# Patient Record
Sex: FEMALE | Race: WHITE | NOT HISPANIC OR LATINO | Employment: FULL TIME | URBAN - METROPOLITAN AREA
[De-identification: names, ages, dates, MRNs, and addresses within clinical notes are randomized per-mention and may not be internally consistent; named-entity substitution may affect disease eponyms.]

---

## 2017-01-10 ENCOUNTER — TELEPHONE (OUTPATIENT)
Dept: FAMILY MEDICINE | Facility: CLINIC | Age: 32
End: 2017-01-10

## 2017-01-10 NOTE — TELEPHONE ENCOUNTER
Pt reports positive home pregnancy test. LMP mid-December 2016. Nurse visit scheduled 1/11/17.   Den Shields RN

## 2017-01-11 ENCOUNTER — TELEPHONE (OUTPATIENT)
Dept: OBGYN | Facility: CLINIC | Age: 32
End: 2017-01-11

## 2017-01-11 ENCOUNTER — PRENATAL OFFICE VISIT (OUTPATIENT)
Dept: NURSING | Facility: CLINIC | Age: 32
End: 2017-01-11
Payer: COMMERCIAL

## 2017-01-11 VITALS
DIASTOLIC BLOOD PRESSURE: 70 MMHG | WEIGHT: 167.9 LBS | BODY MASS INDEX: 26.98 KG/M2 | TEMPERATURE: 97.4 F | SYSTOLIC BLOOD PRESSURE: 110 MMHG | RESPIRATION RATE: 14 BRPM | HEIGHT: 66 IN | OXYGEN SATURATION: 99 % | HEART RATE: 83 BPM

## 2017-01-11 DIAGNOSIS — N91.2 ABSENCE OF MENSTRUATION: Primary | ICD-10-CM

## 2017-01-11 DIAGNOSIS — Z32.01 PREGNANCY TEST POSITIVE: Primary | ICD-10-CM

## 2017-01-11 LAB — BETA HCG QUAL IFA URINE: POSITIVE

## 2017-01-11 PROCEDURE — 84703 CHORIONIC GONADOTROPIN ASSAY: CPT | Performed by: FAMILY MEDICINE

## 2017-01-11 PROCEDURE — 99207 ZZC NO CHARGE NURSE ONLY: CPT

## 2017-01-11 NOTE — PROGRESS NOTES
Subjective: 31 year old patient presents for pregnancy confirmation. Her last menstrual period was 12/15/16. She has had a positive pregnancy test.  This is her first pregnancy, G-0, P-0. She has had previous none. She would like to be seen Dr Ashley for her prenatal care. She will start prenatal vitamins.        Her urine pregnancy test is positive.    Assessment: positive  pregnancy confirmation.    Plan: Patient has an EDC of 9/21/17. Is currently 4 weeks along. She will schedule her first OB appointment with Dr Ashley. Risk assessment done. We discussed dates, basic pregnancy care, diet, exercise and prenatal vitamins.     Pre Term Labor Risk Assessment   1. Is the patient's age <18 or >40? no  2. Does the patient have a BMI < 18.5? no  3. If previous pregnancy, was delivery within previous 6 months? no  4. Have you ever been diagnosed with pyelonephritis? no  5. Have you ever delivered a baby prior to 37 weeks gestation? no  6. Have you ever been told you have a uterine anomaly? no  7. Do you currently have uterine fibroids? no  8. Have you had any gynecological surgical procedures such as cervical conization, a LEEP procedure, laser treatment or cryosurgery of the cervix? yes  9. Did your mother take DEIRDRE or any other hormones when she was pregnant with you? no  10. Did conception for this pregnancy occur via In Vitro Fertilization? no  11. Are you carrying twins? no  12. Do you currently use tobacco products? no  13. Prior to this pregnancy, how much alcohol did you drink each week? (if >7/week= high risk..)  no  14. Since you learned you were pregnant, how much beer, wine or hard liquor did you drink per week? (anything >0 = high risk) none  15. Prior to learning you are pregnant, did you use any of the following: marijuana, prescription narcotics, speed, cocaine, heroin, hallucinogens or other drugs? (any use within 1 yr = high risk)  none  16. Since you learned you are pregnant, have you used any of the  following: marijuana, prescription narcotics, speed, cocaine, heroin, hallucinogens or other drugs? (any use = high risk)  none  17. Do you have a history of chemical dependency (yes=high risk)  no  18. Have you ever been treated for more than 1 Urinary Tract Infection during this pregnancy? no  19. Do you have a history of Depression, Bi-polar disorder, anxiety, schizophrenia or other mental illness?  No   20. Are you currently being treated for depression, bi-polar disorder, anxiety, schizophrenia or other mental illness? no  21. Have you had Chlamydia or gonorrhea during this pregnancy? no  22. Periodontal disease (gum disease)? no  23. Has anyone hit, slapped, kicked or otherwise hurt you? no  24. Has anyone forced you to have sex when you didn't want to? No   Summary:   Patient is not high risk for  Labor        Mariama Hunt RN, BSN  Message handled by Nurse Triage.

## 2017-01-11 NOTE — TELEPHONE ENCOUNTER
Pt here for first OB, need to huddle with Lorin (ob nurse-out ill today), re: u/s order and scheduling first OB appointment, call pt on cell tomorrow with plan    Telephone Information:   Mobile [phone]     Mariama Hunt, RN, BSN  Message handled by Nurse Triage.

## 2017-01-12 NOTE — TELEPHONE ENCOUNTER
Called pt, ob u/s ordered, first OB appointment scheduled, written in book  Mariama Hunt RN, BSN  Message handled by Nurse Triage.

## 2017-01-25 ENCOUNTER — RADIANT APPOINTMENT (OUTPATIENT)
Dept: ULTRASOUND IMAGING | Facility: CLINIC | Age: 32
End: 2017-01-25
Attending: OBSTETRICS & GYNECOLOGY
Payer: COMMERCIAL

## 2017-01-25 DIAGNOSIS — Z32.01 PREGNANCY TEST POSITIVE: ICD-10-CM

## 2017-01-25 PROCEDURE — 76801 OB US < 14 WKS SINGLE FETUS: CPT | Performed by: OBSTETRICS & GYNECOLOGY

## 2017-01-25 PROCEDURE — 76817 TRANSVAGINAL US OBSTETRIC: CPT | Performed by: OBSTETRICS & GYNECOLOGY

## 2017-01-30 ENCOUNTER — TELEPHONE (OUTPATIENT)
Dept: OBGYN | Facility: CLINIC | Age: 32
End: 2017-01-30

## 2017-01-30 NOTE — TELEPHONE ENCOUNTER
Spotting quarter size started yesterday it was brown, just this one spot around 1 pm or so.  10 am did low cardio workout 20 minutes (no new exercise).    Today it was 50/50 on red/brown still quarter, now and just this one spot.    No Cramping, Abd pain nothing.    Please advise  Chris SMITH RN

## 2017-01-31 NOTE — TELEPHONE ENCOUNTER
Pelvic rest until no bleeding for a week. No other restrictions needed. If bleeding becomes heavier or if she has cramping with bleeding, she should call back and should be seen after a repeat ultrasound. Thanks.  Naa Ashley MD

## 2017-02-24 ENCOUNTER — PRENATAL OFFICE VISIT (OUTPATIENT)
Dept: OBGYN | Facility: CLINIC | Age: 32
End: 2017-02-24
Payer: COMMERCIAL

## 2017-02-24 VITALS
SYSTOLIC BLOOD PRESSURE: 110 MMHG | WEIGHT: 172 LBS | HEIGHT: 65 IN | BODY MASS INDEX: 28.66 KG/M2 | TEMPERATURE: 98.1 F | DIASTOLIC BLOOD PRESSURE: 60 MMHG

## 2017-02-24 DIAGNOSIS — O26.90 PREGNANCY RELATED CONDITION, UNSPECIFIED TRIMESTER: Primary | ICD-10-CM

## 2017-02-24 DIAGNOSIS — Z32.01 PREGNANCY TEST POSITIVE: Primary | ICD-10-CM

## 2017-02-24 DIAGNOSIS — Z34.01 ENCOUNTER FOR SUPERVISION OF NORMAL FIRST PREGNANCY IN FIRST TRIMESTER: ICD-10-CM

## 2017-02-24 LAB
ERYTHROCYTE [DISTWIDTH] IN BLOOD BY AUTOMATED COUNT: 12.5 % (ref 10–15)
HCT VFR BLD AUTO: 38.3 % (ref 35–47)
HGB BLD-MCNC: 13.1 G/DL (ref 11.7–15.7)
MCH RBC QN AUTO: 31.9 PG (ref 26.5–33)
MCHC RBC AUTO-ENTMCNC: 34.2 G/DL (ref 31.5–36.5)
MCV RBC AUTO: 93 FL (ref 78–100)
PLATELET # BLD AUTO: 221 10E9/L (ref 150–450)
RBC # BLD AUTO: 4.11 10E12/L (ref 3.8–5.2)
WBC # BLD AUTO: 9.2 10E9/L (ref 4–11)

## 2017-02-24 PROCEDURE — 86900 BLOOD TYPING SEROLOGIC ABO: CPT | Performed by: OBSTETRICS & GYNECOLOGY

## 2017-02-24 PROCEDURE — 90471 IMMUNIZATION ADMIN: CPT | Performed by: OBSTETRICS & GYNECOLOGY

## 2017-02-24 PROCEDURE — 36415 COLL VENOUS BLD VENIPUNCTURE: CPT | Performed by: OBSTETRICS & GYNECOLOGY

## 2017-02-24 PROCEDURE — 86762 RUBELLA ANTIBODY: CPT | Performed by: OBSTETRICS & GYNECOLOGY

## 2017-02-24 PROCEDURE — 85027 COMPLETE CBC AUTOMATED: CPT | Performed by: OBSTETRICS & GYNECOLOGY

## 2017-02-24 PROCEDURE — 90686 IIV4 VACC NO PRSV 0.5 ML IM: CPT | Performed by: OBSTETRICS & GYNECOLOGY

## 2017-02-24 PROCEDURE — 87591 N.GONORRHOEAE DNA AMP PROB: CPT | Performed by: OBSTETRICS & GYNECOLOGY

## 2017-02-24 PROCEDURE — 86780 TREPONEMA PALLIDUM: CPT | Performed by: OBSTETRICS & GYNECOLOGY

## 2017-02-24 PROCEDURE — 87491 CHLMYD TRACH DNA AMP PROBE: CPT | Performed by: OBSTETRICS & GYNECOLOGY

## 2017-02-24 PROCEDURE — 87086 URINE CULTURE/COLONY COUNT: CPT | Performed by: OBSTETRICS & GYNECOLOGY

## 2017-02-24 PROCEDURE — 87389 HIV-1 AG W/HIV-1&-2 AB AG IA: CPT | Performed by: OBSTETRICS & GYNECOLOGY

## 2017-02-24 PROCEDURE — 86850 RBC ANTIBODY SCREEN: CPT | Performed by: OBSTETRICS & GYNECOLOGY

## 2017-02-24 PROCEDURE — 99207 ZZC FIRST OB VISIT: CPT | Performed by: OBSTETRICS & GYNECOLOGY

## 2017-02-24 PROCEDURE — 86901 BLOOD TYPING SEROLOGIC RH(D): CPT | Performed by: OBSTETRICS & GYNECOLOGY

## 2017-02-24 PROCEDURE — 87340 HEPATITIS B SURFACE AG IA: CPT | Performed by: OBSTETRICS & GYNECOLOGY

## 2017-02-24 NOTE — MR AVS SNAPSHOT
After Visit Summary   2/24/2017    Kendal Miranad    MRN: [id number]           Patient Information     Date Of Birth          1985        Visit Information        Provider Department      2/24/2017 2:45 PM Naa Ashley MD [facility]        Today's Diagnoses     Pregnancy test positive    -  1    Encounter for supervision of normal first pregnancy in first trimester           Follow-ups after your visit        Additional Services     [facility]ETAL [facility] REFERRAL-PREGNANCY       >> Patient may proceed with recommendations for further testing as directed by the Maternal Fetal Medicine Specialist >>    >> If requesting Fetal Echo: MFM will determine appropriate location for exam due to indication.    >> If requesting Lung Maturity Amnio:  If results indicate fetal lung maturity, induction or C/S is recommended within 36 hours.  Please schedule accordingly.     Dear Patient:   Please be aware that coverage of these services is subject to the terms and limitations of your health insurance plan.  Call member services at your health plan with any benefit or coverage questions.      Please bring the following to your appointment:    >>  Any x-rays, CTs or MRIs which have been performed.  Contact the facility where they were done to arrange for  prior to your scheduled appointment.  Any new CT, MRI or other procedures ordered by your specialist must be performed at a [place] facility or coordinated by your clinic's referral office.  >>  List of current medications   >>  This referral request   >>  Any documents/labs given to you for this referral                  Future tests that were ordered for you today     Open Future Orders        Priority Expected Expires Ordered    MFM Genetic Counseling Routine  2/25/2018 2/24/2017    MF Nuchal Transluc w US Single Routine  12/24/2017 2/24/2017    [facility] US Comprehensive Single Routine  12/24/2017 2/24/2017            Who to contact      "If you have questions or need follow up information about today's clinic visit or your schedule please contact [facility] directly at [phone].  Normal or non-critical lab and imaging results will be communicated to you by MyChart, letter or phone within 4 business days after the clinic has received the results. If you do not hear from us within 7 days, please contact the clinic through [email or web address] or phone. If you have a critical or abnormal lab result, we will notify you by phone as soon as possible.  Submit refill requests through Proxly or call your pharmacy and they will forward the refill request to us. Please allow 3 business days for your refill to be completed.          Additional Information About Your Visit        [email or web address] Information     Proxly gives you secure access to your electronic health record. If you see a primary care provider, you can also send messages to your care team and make appointments. If you have questions, please call your primary care clinic.  If you do not have a primary care provider, please call [phone] and they will assist you.        Care EveryWhere ID     This is your Care EveryWhere ID. This could be used by other organizations to access your [facility] medical records  [id number]        Your Vitals Were     Temperature Height Last Period BMI (Body Mass Index)          98.1  F (36.7  C) (Oral) 5' 5\" (1.651 m) 12/15/2016 (Exact Date) 28.62 kg/m2         Blood Pressure from Last 3 Encounters:   02/24/17 110/60   01/11/17 110/70   11/03/16 121/72    Weight from Last 3 Encounters:   02/24/17 172 lb (78 kg)   01/11/17 167 lb 14.4 oz (76.2 kg)   11/03/16 165 lb (74.8 kg)              We Performed the Following     ABO/Rh type and screen     Anti Treponema     CBC with platelets     CHLAMYDIA TRACHOMATIS PCR     Hepatitis B surface antigen     HIV Antigen Antibody Combo     MAT FETAL MED CTR REFERRAL-PREGNANCY     NEISSERIA GONORRHOEA PCR     PRESERV FREE " INFLU VAC SPLIT (3+YRS),IM     Rubella Antibody IgG Quantitative     Urine Culture Aerobic Bacterial        Primary Care Provider Office Phone # Fax #    Susan Rachele Haase, APRN [phone]       [facility] [address]  [facility] [address]        Thank you!     Thank you for choosing [facility]  for your care. Our goal is always to provide you with excellent care. Hearing back from our patients is one way we can continue to improve our services. Please take a few minutes to complete the written survey that you may receive in the mail after your visit with us. Thank you!             Your Updated Medication List - Protect others around you: Learn how to safely use, store and throw away your medicines at www.disposemymeds.org.          This list is accurate as of: 2/24/17  4:54 PM.  Always use your most recent med list.                   Brand Name Dispense Instructions for use    hydrOXYzine 25 MG tablet    ATARAX    30 tablet    Take 1-2 tablets (25-50 mg) by mouth every 8 hours as needed for anxiety

## 2017-02-24 NOTE — NURSING NOTE
"Chief Complaint   Patient presents with     Prenatal Care   NPN MD visit.10w1d  Would like 1st trimester screening.  Audelia Mcgowan MA        Initial /60  Temp 98.1  F (36.7  C) (Oral)  Ht 5' 5\" (1.651 m)  Wt 172 lb (78 kg)  LMP 12/15/2016 (Exact Date)  BMI 28.62 kg/m2 Estimated body mass index is 28.62 kg/(m^2) as calculated from the following:    Height as of this encounter: 5' 5\" (1.651 m).    Weight as of this encounter: 172 lb (78 kg).  Medication Reconciliation: complete    "

## 2017-02-24 NOTE — PROGRESS NOTES
This is a 31 year old female patient,   who presents for her first obstetrical visit.    EDC Sep 21, 2017 by lmp and 1TM US which makes her 10w1d weeks today.  Her cycles are regular.  Her last menstrual period was normal. Since her LMP, she has experienced  nausea and fatigue).  She denies emesis, abdominal pain and vaginal bleeding other than small amount of spotting at 6 weeks. Ultrasound in the 1st trimester showed EDC consistent with dates by LMP.     Past History:  Her past medical history   Past Medical History   Diagnosis Date     ASCUS on Pap smear 11/10/14     Neg HPV     High grade squamous intraepithelial lesion of cervix      LSIL (low grade squamous intraepithelial lesion) on Pap smear      S/P LEEP of cervix 13     pt lives in [facility]   .      This is her first pregnancy    Since her last LMP she denies use of alcohol, tobacco and street drugs.    HISTORY:  Obstetric History       T0      TAB0   SAB0   E0   M0   L0       # Outcome Date GA Lbr Carrillo/2nd Weight Sex Delivery Anes PTL Lv   1 Current                 Past Medical History   Diagnosis Date     ASCUS on Pap smear 11/10/14     Neg HPV     High grade squamous intraepithelial lesion of cervix      LSIL (low grade squamous intraepithelial lesion) on Pap smear      S/P LEEP of cervix 13     pt lives in [facility]     Past Surgical History   Procedure Laterality Date     Buckner,cx w/up adj vag,w/bx, cx  2013     MAXIMO 1. 11, 111     Leep tx, cervical  2013     Family History   Problem Relation Age of Onset     Hypertension Mother      Hypertension Maternal Grandmother      DIABETES No family hx of      CANCER No family hx of      Social History     Social History     Marital status:      Spouse name: N/A     Number of children: N/A     Years of education: N/A     Occupational History      [place]     [employer]     Social History Main Topics     Smoking status: Never Smoker     Smokeless  "tobacco: Never Used      Comment: non smoking home     Alcohol use 0.0 oz/week     0 Standard drinks or equivalent per week      Comment: spring break     Drug use: No     Sexual activity: Yes     Partners: Male     Birth control/ protection: Pill     Other Topics Concern     None     Social History Narrative     Current Outpatient Prescriptions   Medication Sig     hydrOXYzine (ATARAX) 25 MG tablet Take 1-2 tablets (25-50 mg) by mouth every 8 hours as needed for anxiety     No current facility-administered medications for this visit.      Allergies   Allergen Reactions     Sulfa Drugs        Past medical, surgical, social and family history were reviewed and updated in EPIC.    ROS:   12 point review of systems negative other than symptoms noted below.    EXAM:  /60  Temp 98.1  F (36.7  C) (Oral)  Ht 5' 5\" (1.651 m)  Wt 172 lb (78 kg)  LMP 12/15/2016 (Exact Date)  BMI 28.62 kg/m2   BMI: Body mass index is 28.62 kg/(m^2).    EXAM:  Constitutional: Appearance: Well nourished, well developed alert, in no acute distress  Neck:  Lymph Nodes:  No lymphadenopathy present    Thyroid:  Gland size normal, nontender, no nodules or masses present  on palpation  Chest:  Respiratory Effort:  Breathing unlabored  Abdominal Examination:  Abdomen nontender to palpation, tone normal without     rigidity or guarding, no masses present, umbilicus without lesions    Liver and speen:  No hepatomegaly present, liver nontender to palpation    Hernias:  No hernias present  Lymphatic: Lymph Nodes:  No other lymphadenopathy present  Skin:  General Inspection:  No rashes present, no lesions present, no areas of  discoloration.  Neurologic/Psychiatric:    Mental Status:  Oriented X3     Pelvis: Not done today. Due for repeat diagnostic pap and HPV in May 2017.    ASSESSMENT:      ICD-10-CM    1. Pregnancy test positive Z32.01 MAT FETAL MED CTR REFERRAL-PREGNANCY     ABO/Rh type and screen     CBC with platelets     Urine Culture Aerobic " Bacterial     Anti Treponema     Hepatitis B surface antigen     HIV Antigen Antibody Combo     NEISSERIA GONORRHOEA PCR     CHLAMYDIA TRACHOMATIS PCR     Rubella Antibody IgG Quantitative   2. Encounter for supervision of normal first pregnancy in first trimester Z34.01        PLAN:    Prenatal labs ordered. She has no questions.    Discussed options for screening for chromosomal anomalies, including first screen, noninvasive prenatal testing, CVS/amniocentesis, quad screen, and ultrasound at 18-20 weeks. She is electing first trimester screen.    Reviewed early pregnancy education, diet, exercise, prenatal vitamins, intercourse. Reviewed the call schedule, labor and delivery, and the schedule of prenatal visits.    Follow up in 4 weeks. She is encouraged to call sooner with questions or concerns.      Naa Ashley MD

## 2017-02-25 LAB
ABO + RH BLD: NORMAL
ABO + RH BLD: NORMAL
BACTERIA SPEC CULT: NORMAL
BLD GP AB SCN SERPL QL: NORMAL
BLOOD BANK CMNT PATIENT-IMP: NORMAL
MICRO REPORT STATUS: NORMAL
SPECIMEN EXP DATE BLD: NORMAL
SPECIMEN SOURCE: NORMAL

## 2017-02-26 ENCOUNTER — TELEPHONE (OUTPATIENT)
Dept: NURSING | Facility: CLINIC | Age: 32
End: 2017-02-26

## 2017-02-26 LAB
C TRACH DNA SPEC QL NAA+PROBE: NORMAL
N GONORRHOEA DNA SPEC QL NAA+PROBE: NORMAL
SPECIMEN SOURCE: NORMAL
SPECIMEN SOURCE: NORMAL
T PALLIDUM IGG+IGM SER QL: NEGATIVE

## 2017-02-26 NOTE — TELEPHONE ENCOUNTER
"Call Type: Triage Call    Presenting Problem: \" I have a sore throat starting yesterday, I am  10 weeks pregnant, I don't have a fever, cough or headache or rash.  I have a very mild runny nose and congestion. \" Advised to do sore  throat home care for pregnancy. If sore throat continues for next 24  hours or if develops a fever to call PCP tomorrow.  Triage Note:  Guideline Title: Sore Throat or Hoarseness  Recommended Disposition: Provide Home/Self Care  Original Inclination: Wanted to speak with a nurse  Override Disposition:  Intended Action: Follow Selfcare / Homecare  Physician Contacted: No  Sore throat AND no other symptoms ?  YES  Creamy-white sore patches in the mouth or throat AND patches brush off or come off  when eating, leaving a new, bleeding surface ? NO  Known exposure to mono (infectious mononucleosis) ? NO  Evaluated by provider AND no improvement or symptoms worsening when following  recommended treatment plan for 48 hours ? NO  Choking sensation, cannot swallow own saliva with associated drooling and soft  muffled voice ? NO  Exposure to irritants (smoke, fumes, gases, etc.) ? NO  Cannot open mouth fully due to severe pain ? NO  Marked difficulty swallowing due to sore throat unresponsive to 12 hours of home  care ? NO  New onset of painful, swollen glands on sides of neck or under jaw ? NO  Voice overuse (yelling, singing or prolonged speaking) AND not responding to home  care ? NO  Temperature of 101.5 F (38.6 C) or greater that has not responded to 24 hours of  home care measures ? NO  Exposure to an individual with diagnosed strep throat but who has not completed 24  hours of antibiotic therapy ? NO  Localized tender, swollen glands (lymph nodes) that persist or are unimproved  after 14 days ? NO  Severe breathing problems not related to nasal congestion ? NO  Coughed out foreign object after choking episode and NO further choking symptoms  (can now talk, no coughing, gasping, or wheezing) " ? NO  Recent or recurrent episodes of sneezing, nasal congestion; watery nasal drainage;  scratchy/itchy throat; red/itchy/watery eyes or cough unrelieved after one week  of home care measures ? NO  Sore throat not responding to 7 days of home care ? NO  Has enlarged tonsils covered by yellow-white patches ? NO  Hoarseness that has persisted more than 2 weeks ? NO  Swallowing difficulty related to anything other than sore throat such as swallowed  toxic or caustic substance, mechanical abnormalities, or motility problems ? NO  New onset of stiff neck causing pain with forward head movement, severe  generalized headache, fever, or altered mental status ? NO  Non-itchy, red skin rash ? NO  No tonsils and white patches on back of throat ? NO  Pregnant and has a temperature up to 100 F (37.7 C) that has not responded to 3  days of home care ? NO  Had negative rapid strep test but no throat culture; having continued symptoms of  sore throat, fever, swollen glands ? NO  Sore throat with negative rapid strep test and negative throat culture ? NO  Previous call within last week for similar symptoms AND has followed recommended  self care measures for a week but symptoms have not improved or symptoms have  worsened. ? NO  Pregnant and has temperature 100F (37.7 C) or greater ? NO  Any temperature elevation in an immunocompromised individual OR frail elderly with  signs of dehydration ? NO  Physician Instructions:  Care Advice: Drink more fluids -- water, low-sugar juices, tea and warm  soup, especially chicken broth, are options.  Avoid caffeinated or  alcoholic beverages because they can increase the chance of dehydration.  SYMPTOM / CONDITION MANAGEMENT  To help relieve nasal congestion, use nonprescription saline nasal spray  according to label instructions or as directed by a healthcare provider.  Sore Throat Relief:   - Use salt water gargles (1/2 teaspoon salt in 8 oz.  [240mL] warm water) every one to two hours. - Use  a vaporizer or cool mist  humidifier in the room when sleeping. - Suck on hard candy, nonprescription  or herbal throat lozenges (sugar-free if diabetic) - Eat soothing, soft  food/fluids (broths, soups, or honey and lemon juice in hot tea, Popsicles,  frozen yogurt or sherbet, scrambled eggs, cooked cereals, Jell-O or  puddings) whichever is most comforting. - Avoid eating salty, spicy or  acidic foods.  Total water intake includes drinking water, water in beverages, and water  contained in food. Fluids make up about 80% of the body's total hydration  need. Individual fluid requirement to maintain hydration vary based on  physical activity, environmental factors and illness. Limit fluids that  contain sugar, caffeine, or alcohol. Urine will be very light yellow color  when you drink enough fluids.

## 2017-02-27 LAB
HBV SURFACE AG SERPL QL IA: NONREACTIVE
HIV 1+2 AB+HIV1 P24 AG SERPL QL IA: NORMAL
RUBV IGG SERPL IA-ACNC: 62 IU/ML

## 2017-03-01 ENCOUNTER — OFFICE VISIT (OUTPATIENT)
Dept: FAMILY MEDICINE | Facility: CLINIC | Age: 32
End: 2017-03-01
Payer: COMMERCIAL

## 2017-03-01 VITALS
TEMPERATURE: 97.5 F | OXYGEN SATURATION: 97 % | BODY MASS INDEX: 27.83 KG/M2 | DIASTOLIC BLOOD PRESSURE: 64 MMHG | SYSTOLIC BLOOD PRESSURE: 108 MMHG | WEIGHT: 173.2 LBS | HEART RATE: 83 BPM | HEIGHT: 66 IN

## 2017-03-01 DIAGNOSIS — J06.9 UPPER RESPIRATORY TRACT INFECTION, UNSPECIFIED TYPE: Primary | ICD-10-CM

## 2017-03-01 DIAGNOSIS — Z33.1 PREGNANCY, INCIDENTAL: ICD-10-CM

## 2017-03-01 PROCEDURE — 99213 OFFICE O/P EST LOW 20 MIN: CPT | Performed by: INTERNAL MEDICINE

## 2017-03-01 RX ORDER — LORATADINE 10 MG/1
10 TABLET ORAL DAILY
Qty: 30 TABLET | Refills: 1 | Status: ON HOLD | COMMUNITY
Start: 2017-03-01 | End: 2020-02-21

## 2017-03-01 NOTE — PROGRESS NOTES
SUBJECTIVE:                                                    Kendal Miranda is a 31 year old female who presents to clinic today for the following health issues:     HPI    RESPIRATORY SYMPTOMS    Duration: Five days.    Description  Sore throat, facial pain/pressure, cough, fever, chills, ear pain bilaterally and headaches.    Severity: Moderate    Accompanying signs and symptoms: None    History (predisposing factors):  None    Precipitating or alleviating factors: None    Therapies tried and outcome:  Benadryl last night, rest, and increased fluid intake. Symptoms have not resolved.    Patient is 11 weeks pregnant, which is why she is increasingly concerned.       Problem list and histories reviewed & adjusted, as indicated.  Additional history: as documented    BP Readings from Last 3 Encounters:   03/01/17 108/64   02/24/17 110/60   01/11/17 110/70    Wt Readings from Last 3 Encounters:   03/01/17 78.6 kg (173 lb 3.2 oz)   02/24/17 78 kg (172 lb)   01/11/17 76.2 kg (167 lb 14.4 oz)         Labs reviewed in [facility]      REVIEW OF SYSTEMS:  C: POSITIVE for fever and chills; NEGATIVE for change in weight  E: NEGATIVE for vision changes or irritation  E/M: POSITIVE for sore throat, facial pain/pressure, and bilateral ear pain; NEGATIVE for mouth problems  R: POSITIVE for a cough; NEGATIVE for SOB  CV: NEGATIVE for chest pain, palpitations or peripheral edema  GI: NEGATIVE for nausea, abdominal pain, heartburn, or change in bowel habits  M: NEGATIVE for significant arthralgias or myalgia  N: POSITIVE for headaches; NEGATIVE for weakness, dizziness or paresthesias  P: NEGATIVE for changes in mood or affect    This document serves as a record of the services and decisions personally performed and made by Tabatha Mckeon MD. It was created on her behalf by Arcelia Serrano, a trained medical scribe. The creation of this document is based the provider's statements to the medical scribe.  Arcelia Serrano, March 1, 2017 3:39  "PM     OBJECTIVE:                                                    /64 (BP Location: Right arm, Patient Position: Chair, Cuff Size: Adult Large)  Pulse 83  Temp 97.5  F (36.4  C) (Oral)  Ht 1.67 m (5' 5.75\")  Wt 78.6 kg (173 lb 3.2 oz)  LMP 12/15/2016 (Exact Date)  SpO2 97%  BMI 28.17 kg/m2  Body mass index is 28.17 kg/(m^2).    EXAM:  GENERAL: Patient appears healthy, alert and not distressed.  EYES: Eyes appear grossly normal to inspection, with normal conjunctivae and sclerae  HENT: Nasal congestion noted bilaterally with evidence of post nasal drainage along the posterior oropharynx; Ear canals and TM's appear normal  NECK: No adenopathy present, no asymmetry, masses, or scars noted, thyroid is normal to palpation  RESP: Lungs are clear to auscultation - no rales, rhonchi or wheezes present  CV: Regular rate and rhythm, normal S1 S2 heart sounds, no ectopy, no peripheral edema present, peripheral pulses normal, no carotid bruit.  MS: No gross musculoskeletal defects noted, no edema, gait is age appropriate without ataxia  NEURO: Patient exhibits normal strength and tone, normal speech and mentation  PSYCH: Mentation appears normal, affect is normal/bright      Diagnostic Test Results:  No results found for this or any previous visit (from the past 24 hour(s)).      ASSESSMENT/PLAN:                                                    (J06.9) Upper respiratory tract infection, unspecified type  (primary encounter diagnosis)  Comment: Patient is 11 weeks pregnant which limits medication therapies; Due to nasal congestion and symptoms related to post nasal drainage including cough, bilateral ear pain, and sore throat, I believe the patient would benefit from a Saline Nasal Spray, Neti-wash, and Loratidine/Claritin over-the-counter.  Plan: Try over-the-counter Claritin and a Saline Nasal Spray for relief of nasal congestion; Follow-up with the clinic if symptoms worsen or fail to improve.    (Z33.1) Pregnancy, " incidental  Comment: Patient is 11 weeks pregnant which limits which medications can be used for her current symptoms.  Plan: Try over-the-counter Claritin and a Saline Nasal Spray for relief of nasal congestion; Follow-up with the clinic if symptoms worsen or fail to improve.      The information in this document, created by a medical scribe for me, accurately reflects the services I personally performed and the decisions made by me. I have reviewed and approved this document for accuracy.  Dr. Tabatha Mckeon, 3:47 PM, March 1, 2017    Tabatha Mckeon MD  Internal Medicine   [facility]

## 2017-03-01 NOTE — PATIENT INSTRUCTIONS
I recommend you try using Saline Nasal Spray (or a neti-wash) and a daily dose of Claritin/Loratidine to help alleviate your nasal congestion. By clearing up your congestion, you will find relief of you ear pressure, sore throat, and cough. You can continue to use Benadryl at night to help clear-up your symptoms and help you sleep at night.

## 2017-03-01 NOTE — MR AVS SNAPSHOT
After Visit Summary   3/1/2017    Kendal Miranda    MRN: [id number]           Patient Information     Date Of Birth          1985        Visit Information        Provider Department      3/1/2017 3:00 PM [facility]        Today's Diagnoses     Upper respiratory tract infection, unspecified type    -  1    Pregnancy, incidental          Care Instructions    I recommend you try using Saline Nasal Spray (or a neti-wash) and a daily dose of Claritin/Loratidine to help alleviate your nasal congestion. By clearing up your congestion, you will find relief of you ear pressure, sore throat, and cough. You can continue to use Benadryl at night to help clear-up your symptoms and help you sleep at night.        Follow-ups after your visit        Your next 10 appointments already scheduled     Mar 14, 2017  8:00 AM CDT   Genetic Counseling with  GEN COUNSELOR 2   [facility] Maternal Fetal Medicine [facility])    [address] [facility] [address]  [facility] [address]   [phone]            Mar 14, 2017  8:45 AM CDT   MFM NUCHAL TRANS W US SINGLE with RHMFMUSR2   [facility] Maternal Fetal Medicine Ultrasound - [facility])    [address]  [facility] [id number] [phone]            Mar 14, 2017  9:15 AM CDT   Radiology MD with  MFM MD   [facility] M[facility] Fetal Medicine [facility])    [address] [facility] [address]  [facility] [address]   [phone]           Please arrive at the time given for your first appointment.  This visit is used internally to schedule the physician's time during your ultrasound.            Apr 28, 2017  8:00 AM CDT   M US COMP with RHMFMUSR3   [facility] Maternal Fetal Medicine Ultrasound - [facility])    [address]  [facility] [address] [phone]           Wear comfortable clothes and leave your  "valuables at home.            Apr 28, 2017  8:30 AM CDT   Radiology MD with RH ANGELICA CHILEL   [facility])    [address] [facility] [address]  [facility] [address] [phone]           Please arrive at the time given for your first appointment.  This visit is used internally to schedule the physician's time during your ultrasound.              Who to contact     If you have questions or need follow up information about today's clinic visit or your schedule please contact [facility] directly at [phone].  Normal or non-critical lab and imaging results will be communicated to you by DataCentredhart, letter or phone within 4 business days after the clinic has received the results. If you do not hear from us within 7 days, please contact the clinic through CEYXt or phone. If you have a critical or abnormal lab result, we will notify you by phone as soon as possible.  Submit refill requests through Lifeenergy or call your pharmacy and they will forward the refill request to us. Please allow 3 business days for your refill to be completed.          Additional Information About Your Visit        DataCentredhart Information     Lifeenergy gives you secure access to your electronic health record. If you see a primary care provider, you can also send messages to your care team and make appointments. If you have questions, please call your primary care clinic.  If you do not have a primary care provider, please call [phone] and they will assist you.        Care EveryWhere ID     This is your Care EveryWhere ID. This could be used by other organizations to access your [facility] medical records  [id number]        Your Vitals Were     Pulse Temperature Height Last Period Pulse Oximetry BMI (Body Mass Index)    83 97.5  F (36.4  C) (Oral) 5' 5.75\" (1.67 m) 12/15/2016 (Exact Date) 97% 28.17 kg/m2       Blood Pressure from Last 3 Encounters:   03/01/17 108/64   02/24/17 " 110/60   01/11/17 110/70    Weight from Last 3 Encounters:   03/01/17 173 lb 3.2 oz (78.6 kg)   02/24/17 172 lb (78 kg)   01/11/17 167 lb 14.4 oz (76.2 kg)              Today, you had the following     No orders found for display       Primary Care Provider Office Phone # Fax #    Lindy Sequeirahele Haase, CONSTANCE [phone]       [address] [address]        Thank you!     [facility] you [facility]  for your care. Our goal is always to provide you with excellent care. Hearing back from our patients is one way we can continue to improve our services. Please take a few minutes to complete the written survey that you may receive in the mail after your visit with us. Thank you!             Your Updated Medication List - Protect others around you: Learn how to safely use, store and throw away your medicines at www.disposemymeds.org.      Notice  As of 3/1/2017  3:45 PM    You have not been prescribed any medications.

## 2017-03-01 NOTE — NURSING NOTE
"Chief Complaint   Patient presents with     URI       Initial /64 (BP Location: Right arm, Patient Position: Chair, Cuff Size: Adult Large)  Pulse 83  Temp 97.5  F (36.4  C) (Oral)  Ht 5' 5.75\" (1.67 m)  Wt 173 lb 3.2 oz (78.6 kg)  LMP 12/15/2016 (Exact Date)  SpO2 97%  BMI 28.17 kg/m2 Estimated body mass index is 28.17 kg/(m^2) as calculated from the following:    Height as of this encounter: 5' 5.75\" (1.67 m).    Weight as of this encounter: 173 lb 3.2 oz (78.6 kg).  Medication Reconciliation: complete    "

## 2017-03-10 ENCOUNTER — PRE VISIT (OUTPATIENT)
Dept: MATERNAL FETAL MEDICINE | Facility: CLINIC | Age: 32
End: 2017-03-10

## 2017-03-14 ENCOUNTER — HOSPITAL ENCOUNTER (OUTPATIENT)
Dept: LAB | Facility: CLINIC | Age: 32
End: 2017-03-14
Attending: OBSTETRICS & GYNECOLOGY
Payer: COMMERCIAL

## 2017-03-14 ENCOUNTER — HOSPITAL ENCOUNTER (OUTPATIENT)
Dept: ULTRASOUND IMAGING | Facility: CLINIC | Age: 32
Discharge: HOME | End: 2017-03-14
Attending: OBSTETRICS & GYNECOLOGY | Admitting: OBSTETRICS & GYNECOLOGY
Payer: COMMERCIAL

## 2017-03-14 ENCOUNTER — OFFICE VISIT (OUTPATIENT)
Dept: MATERNAL FETAL MEDICINE | Facility: CLINIC | Age: 32
End: 2017-03-14
Attending: OBSTETRICS & GYNECOLOGY
Payer: COMMERCIAL

## 2017-03-14 DIAGNOSIS — O26.90 PREGNANCY RELATED CONDITION, UNSPECIFIED TRIMESTER: ICD-10-CM

## 2017-03-14 DIAGNOSIS — Z36.9 FIRST TRIMESTER SCREENING: Primary | ICD-10-CM

## 2017-03-14 DIAGNOSIS — Z36.3 ENCOUNTER FOR ROUTINE SCREENING FOR MALFORMATION USING ULTRASONICS: Primary | ICD-10-CM

## 2017-03-14 DIAGNOSIS — Z36.3 ENCOUNTER FOR ROUTINE SCREENING FOR MALFORMATION USING ULTRASONICS: ICD-10-CM

## 2017-03-14 PROCEDURE — 84704 HCG FREE BETACHAIN TEST: CPT | Performed by: OBSTETRICS & GYNECOLOGY

## 2017-03-14 PROCEDURE — 84163 PAPPA SERUM: CPT | Performed by: OBSTETRICS & GYNECOLOGY

## 2017-03-14 PROCEDURE — 96040 ZZH GENETIC COUNSELING, EACH 30 MINUTES: CPT | Mod: ZF | Performed by: GENETIC COUNSELOR, MS

## 2017-03-14 PROCEDURE — 36416 COLLJ CAPILLARY BLOOD SPEC: CPT | Performed by: OBSTETRICS & GYNECOLOGY

## 2017-03-14 PROCEDURE — 76813 OB US NUCHAL MEAS 1 GEST: CPT

## 2017-03-14 NOTE — PROGRESS NOTES
[facility] [place]  Genetic Counseling Consult    Patient: Kendal Miranda YOB: 1985   Date of Service: 3/14/17      Kendal Miranda was seen at [facility] [place] for genetic consultation to discuss the options for routine screening for fetal chromosome abnormalities.       Impression/Plan:   1.  Kendal had an ultrasound and blood draw for first trimester screening.  Results are expected within 5 days, and will be available in Results United.  We will contact her to discuss the results, and a copy will be forwarded to the office of the referring OB provider. Kendal requested a detailed message with results on her voicemail ([phone]).     2. Maternal serum AFP (single marker screen) is recommended after 15 weeks to screen for open neural tube defects. A quad screen should not be performed.    Pregnancy History:   /Parity:    Age at Delivery: 32 year old  RANDAL: 2017, Alternate RANDAL Entry  Gestational Age: 12w5d    No significant complications or exposures were reported in the current pregnancy.    Medical History:   Kendal s reported medical history is not expected to impact pregnancy management or risks to fetal development.       Family History:   A three-generation pedigree was obtained, and is scanned under the  Media  tab.   The following significant findings were reported by Kendal:    Kendal reports a maternal first cousin who  shortly after birth from anencephaly.  We discussed that isolated open neural tube defects are a multi-factorial condition caused by the combination of genetic factors and environment.  Due to the distance of this relative, the risk for an open neural tube defect would not be increased above general population risk.  We did review AFP and ultrasound screening.      Otherwise, the reported family history is negative for multiple miscarriages, stillbirths, birth defects, mental retardation, known genetic  conditions, and consanguinity.       Carrier Screening:   The patient reports that she and the father of the pregnancy have  ancestry:      Cystic fibrosis is an autosomal recessive genetic condition that occurs with increased frequency in individuals of  ancestry and carrier screening for this condition is available.  In addition,  screening in the [facility] includes cystic fibrosis.      Expanded carrier screening for mutations in a large panel of genes associated with autosomal recessive conditions including cystic fibrosis, spinal muscular atrophy, and others, is now available.      The patient has declined the carrier screening options reviewed today.       Risk Assessment for Chromosome Conditions:   We explained that the risk for fetal chromosome abnormalities increases with maternal age. We discussed specific features of common chromosome abnormalities, including Down syndrome, trisomy 13, trisomy 18, and sex chromosome trisomies.      - At age 32 at midtrimester, the risk to have a baby with Down syndrome is 1 in 690.     - At age 32 at midtrimester, the risk to have a baby with any chromosome abnormality is 1 in 345.          Testing Options:   We discussed the following options:   First trimester screening    First trimester ultrasound with nuchal translucency and nasal bone assessments, maternal plasma hCG, KERVIN-A, and AFP measurement    Screens for fetal trisomy 21, trisomy 13, and trisomy 18    Cannot screen for open neural tube defects; maternal serum AFP after 15 weeks is recommended     Non-invasive Prenatal Testing (NIPT)    Maternal plasma cell-free DNA testing; first trimester ultrasound with nuchal translucency and nasal bone assessment is recommended, when appropriate    Screens for fetal trisomy 21, trisomy 13, trisomy 18, and sex chromosome aneuploidy    Cannot screen for open neural tube defects; maternal serum AFP after 15 weeks is recommended      We  reviewed the benefits and limitations of this testing.  Screening tests provide a risk assessment specific to the pregnancy for certain fetal chromosome abnormalities, but cannot definitively diagnose or exclude a fetal chromosome abnormality.  Follow-up genetic counseling and consideration of diagnostic testing is recommended with any abnormal screening result.      It was a pleasure to be involved with Kendal s care. Face-to-face time of the meeting was 30 minutes.    Vika Hays, [facility]  Certified Genetic Counselor  VM: [phone]

## 2017-03-14 NOTE — MR AVS SNAPSHOT
After Visit Summary   3/14/2017    Kendal Miranda    MRN: [id number]           Patient Information     Date Of Birth          1985        Visit Information        Provider Department      3/14/2017 8:00 AM Vika Hays [facility]        Today's Diagnoses     Encounter for routine screening for malformation using ultrasonics    -  1    Pregnancy related condition, unspecified trimester           Follow-ups after your visit        Your next 10 appointments already scheduled     Apr 28, 2017  8:00 AM CDT   MFM US COMP with MFMUSR3   [facility] Fetal [facility] Ultrasound [facility])    [address]  [facility] [address] [phone]           Wear comfortable clothes and leave your valuables at home.            Apr 28, 2017  8:30 AM CDT   [facility]   [facility] ([facility])    [address]  [facility] [address] [phone]           Please arrive at the time given for your first appointment.  This visit is used internally to schedule the physician's time during your ultrasound.              Future tests that were ordered for you today     Open Future Orders        Priority Expected Expires Ordered    First Trimester Screen- NTD Labs Routine  6/12/2017 3/14/2017            Who to contact     If you have questions or need follow up information about today's clinic visit or your schedule please contact [facility] directly at [phone].  Normal or non-critical lab and imaging results will be communicated to you by MyChart, letter or phone within 4 business days after the clinic has received the results. If you do not hear from us within 7 days, please contact the clinic through MyChart or phone. If you have a critical or abnormal lab result, we will notify you by phone as soon as  possible.  Submit refill requests through Elastera or call your pharmacy and they will forward the refill request to us. Please allow 3 business days for your refill to be completed.          Additional Information About Your Visit        CitySpadehart Information     Elastera gives you secure access to your electronic health record. If you see a primary care provider, you can also send messages to your care team and make appointments. If you have questions, please call your primary care clinic.  If you do not have a primary care provider, please call [phone] and they will assist you.        Care EveryWhere ID     This is your Care EveryWhere ID. This could be used by other organizations to access your [facility] medical records  [id number]        Your Vitals Were     Last Period                   12/15/2016 (Exact Date)            Blood Pressure from Last 3 Encounters:   03/01/17 108/64   02/24/17 110/60   01/11/17 110/70    Weight from Last 3 Encounters:   03/01/17 78.6 kg (173 lb 3.2 oz)   02/24/17 78 kg (172 lb)   01/11/17 76.2 kg (167 lb 14.4 oz)              We Performed the Following     [facility] Genetic Counseling        Primary Care Provider Office Phone # Fax #    Susan Rachele Haase, APRN [phone]       [address] [address]        Thank you!     Thank you for choosing [facility]  for your care. Our goal is always to provide you with excellent care. Hearing back from our patients is one way we can continue to improve our services. Please take a few minutes to complete the written survey that you may receive in the mail after your visit with us. Thank you!             Your Updated Medication List - Protect others around you: Learn how to safely use, store and throw away your medicines at www.disposemymeds.org.          This list is accurate as of: 3/14/17 10:25 AM.  Always use your most recent med list.                    Brand Name Dispense Instructions for use    [employer] SALINE 4 Soln          loratadine 10 MG tablet    CLARITIN    30 tablet    Take 1 tablet (10 mg) by mouth daily

## 2017-03-14 NOTE — MR AVS SNAPSHOT
After Visit Summary   3/14/2017    Kendal Miranda    MRN: [id number]           Patient Information     Date Of Birth          1985        Visit Information        Provider Department      3/14/2017 9:15 AM Amanda Allison MD [facility] Maternal Fetal Medicine [facility]        Today's Diagnoses     First trimester screening    -  1       Follow-ups after your visit        Your next 10 appointments already scheduled     Apr 28, 2017  8:00 AM CDT   MFM US COMP with [id number]   [facility] Maternal Fetal Medicine Ultrasound - [facility])    [address]  [facility] [address] [phone]           Wear comfortable clothes and leave your valuables at home.            Apr 28, 2017  8:30 AM CDT   Radiology MD with  ANGELICA CHILEL   [facility] Maternal Fetal Medicine [facility])    [address]  [facility] [address] [phone]           Please arrive at the time given for your first appointment.  This visit is used internally to schedule the physician's time during your ultrasound.              Future tests that were ordered for you today     Open Future Orders        Priority Expected Expires Ordered    First Trimester Screen- NTD Labs Routine  6/12/2017 3/14/2017            Who to contact     If you have questions or need follow up information about today's clinic visit or your schedule please contact [facility] F[facility]AL [facility] directly at [phone].  Normal or non-critical lab and imaging results will be communicated to you by MyChart, letter or phone within 4 business days after the clinic has received the results. If you do not hear from us within 7 days, please contact the clinic through MyChart or phone. If you have a critical or abnormal lab result, we will notify you by phone as soon as possible.  Submit refill requests through Aveso or call your pharmacy and they will forward the refill request to  us. Please allow 3 business days for your refill to be completed.          Additional Information About Your Visit        MyChart Information     [email or web address] gives you secure access to your electronic health record. If you see a primary care provider, you can also send messages to your care team and make appointments. If you have questions, please call your primary care clinic.  If you do not have a primary care provider, please call [phone] and they will assist you.        Care EveryWhere ID     This is your Care EveryWhere ID. This could be used by other organizations to access your [facility] medical records  [id number]        Your Vitals Were     Last Period                   12/15/2016 (Exact Date)            Blood Pressure from Last 3 Encounters:   03/01/17 108/64   02/24/17 110/60   01/11/17 110/70    Weight from Last 3 Encounters:   03/01/17 78.6 kg (173 lb 3.2 oz)   02/24/17 78 kg (172 lb)   01/11/17 76.2 kg (167 lb 14.4 oz)              Today, you had the following     No orders found for display       Primary Care Provider Office Phone # Fax #    Lindy Rachele Haase, APRN [phone]       [address] [address]        Thank you!     Thank you for choosing [facility]  for your care. Our goal is always to provide you with excellent care. Hearing back from our patients is one way we can continue to improve our services. Please take a few minutes to complete the written survey that you may receive in the mail after your visit with us. Thank you!             Your Updated Medication List - Protect others around you: Learn how to safely use, store and throw away your medicines at www.disposemymeds.org.          This list is accurate as of: 3/14/17  4:31 PM.  Always use your most recent med list.                   Brand Name Dispense Instructions for use    CVS SALINE 4 Soln          loratadine 10 MG tablet    CLARITIN     30 tablet    Take 1 tablet (10 mg) by mouth daily

## 2017-03-14 NOTE — PROGRESS NOTES
"Please see \"Imaging\" tab under \"Chart Review\" for details of today's US.    Amanda Allison    "

## 2017-03-15 ENCOUNTER — TELEPHONE (OUTPATIENT)
Dept: NURSING | Facility: CLINIC | Age: 32
End: 2017-03-15

## 2017-03-16 ENCOUNTER — TELEPHONE (OUTPATIENT)
Dept: MATERNAL FETAL MEDICINE | Facility: CLINIC | Age: 32
End: 2017-03-16

## 2017-03-16 DIAGNOSIS — O28.9 ABNORMAL FINDINGS ON ANTENATAL SCREENING: Primary | ICD-10-CM

## 2017-03-16 NOTE — TELEPHONE ENCOUNTER
"Call Type: Triage Call    Presenting Problem: \"13 weeks pregnant and have had a headache 7/10.\"  Triage Note:  Guideline Title: Headache  Recommended Disposition: See Provider within 24 hours  Original Inclination: Wanted to speak with a nurse  Override Disposition:  Intended Action: Follow advice given  Physician Contacted: No  Constant headache AND unrelieved with nonprescription medications ?  YES  Follows head trauma ? NO  Unconscious now ? NO  Smoke/carbon monoxide exposure ? NO  Typical headache AND usual therapy is not available or is not working ? NO  Headache not relieved with home care measures and occurring within 48 hours of  head trauma ? NO  New seizure now or within last 6 hours ? NO  Pregnancy 20 weeks or more with sudden onset or worsening facial or hand swelling  ? NO  Temperature of 101.5 F (38.6 C) or greater that has not responded to 24 hours of  home care measures ? NO  New onset of severe dizziness/vertigo ? NO  Sudden loss or change in vision (double or blurred vision, increased light  sensitivity, partial loss of visual field) AND not previously evaluated ? NO  Sudden, severe disabling head pain OR caller spontaneously verbalizes \"worst  headache of my life\" ? NO  Sudden change in mental status or new change in speech ? NO  New onset of moderate to severe headache and taking anticoagulants (e.g. Coumadin,  warfarin, Lovenox, Plavix, Pradaxa, or Xarelto). ? NO  New tenderness over temporal area in individuals age 40 years or older ? NO  Being treated by a provider for a secondary infection AND no improvement in  symptoms, symptoms have worsened OR has new symptoms after following treatment  plan for the time specified by provider. ? NO  New onset of severe or worsening generalized headache AND fever, neck pain with  forward head movement (no injury) or altered mental status ? NO  High to low (but not zero) risk of exposure to Ebola within the past 21 days ? NO  Traveled out of country in past " "2 months and new onset of unexplained symptom(s) ?  NO  Any temperature elevation in an immunocompromised individual OR frail elderly with  signs of dehydration ? NO  New numbness, weakness or paralysis involving face, arm or leg, especially on same  side of body, loss of balance or coordination, confusion or trouble speaking  occurring now or within last 8 hours ? NO  Sudden onset of one-sided headache with severe eye pain or eye tearing on same  side ? NO  New onset of severe or unusual headache unrelieved with 4 hours of home care ? NO  Onset of vomiting associated with severe, worsening headache, especially if  different from previous headaches                       See Provider within 4  hours ? NO  Physician Instructions:  Care Advice: Do not take aspirin for headache until discussing with your  provider.  Call  immediately if any of the following occur: any loss of  consciousness  new confusion, drowsiness or agitation  difficulty speaking  new weakness or paralysis, severe numbness, or difficulty moving.  CAUTIONS  Call provider immediately if headache becomes more severe.  SYMPTOM / CONDITION MANAGEMENT  Call  if having a sudden, severe disabling headache OR if the person  spontaneously verbalized this headache is \"the worst headache of my life.\"  "

## 2017-03-17 ENCOUNTER — HOSPITAL ENCOUNTER (OUTPATIENT)
Dept: LAB | Facility: CLINIC | Age: 32
Discharge: HOME | End: 2017-03-17
Attending: OBSTETRICS & GYNECOLOGY | Admitting: OBSTETRICS & GYNECOLOGY
Payer: COMMERCIAL

## 2017-03-17 ENCOUNTER — OFFICE VISIT (OUTPATIENT)
Dept: MATERNAL FETAL MEDICINE | Facility: CLINIC | Age: 32
End: 2017-03-17
Attending: OBSTETRICS & GYNECOLOGY
Payer: COMMERCIAL

## 2017-03-17 DIAGNOSIS — O28.9 ABNORMAL FINDINGS ON ANTENATAL SCREENING: ICD-10-CM

## 2017-03-17 DIAGNOSIS — O28.1 ABNORMAL BIOCHEMICAL FINDING ON ANTENATAL SCREENING OF MOTHER: ICD-10-CM

## 2017-03-17 DIAGNOSIS — O28.1 ABNORMAL BIOCHEMICAL FINDING ON ANTENATAL SCREENING OF MOTHER: Primary | ICD-10-CM

## 2017-03-17 PROCEDURE — 36415 COLL VENOUS BLD VENIPUNCTURE: CPT | Performed by: OBSTETRICS & GYNECOLOGY

## 2017-03-17 PROCEDURE — 40000791 ZZHCL STATISTIC VERIFI PRENATAL TRISOMY 21,18,13: Performed by: OBSTETRICS & GYNECOLOGY

## 2017-03-17 PROCEDURE — 40000072 ZZH STATISTIC GENETIC COUNSELING, < 16 MIN: Mod: ZF | Performed by: GENETIC COUNSELOR, MS

## 2017-03-17 NOTE — PROGRESS NOTES
[facility]  Genetic Counseling Consult    Patient: Kendal Miranda YOB: 1985   Date of Service:  3/17/17      Kendal Miranda was seen at the [facility] for genetic consultation given abnormal first trimester screen results.      Impression/Plan:   Kendal had a blood draw for NIPT (Innatal (formerly Verifi) test through [employer]).  Results are expected within 7-10 days, and will be available in [facility]. We will contact her to discuss the results, and a copy will be forwarded to the office of the referring OB provider.  Kendal requested a detailed message with results on her voicemail ([phone] ). She does want fetal sex information on her voicemail.       Pregnancy History:   /Parity:                            Age at Delivery: 32 year old  RANDAL: 2017, Alternate RANDAL Entry                  Gestational Age: 13w1d  -  No significant complications or exposures were reported in the current pregnancy.       Risk Assessment:   We explained that the risk for fetal chromosome abnormalities increases with maternal age. We discussed specific features of common chromosome abnormalities, including Down syndrome, trisomy 13, trisomy 18, and sex chromosome trisomies.    At age 31 at delivery, the midtrimester risk to have a baby with Down syndrome is 1 in 760.     At age 31 at delivery, the midtrimester risk to have a baby with any chromosome abnormality is 1 in 380.     The patient had a first trimester screen on 3/14/2017    The nuchal translucency (NT) measurement was 2.4 mm, which is below the 95th percentile    Chemical values were as follows:    hCG: 3.74 MoM    KERVIN-A: 0.99 MoM    AFP: 1.16 MoM    The first trimester screen gave the following risk assessments:    Down syndrome risk= 1 :155    Trisomy 13/18 risk= 1:>10,000     Testing Options:   We discussed the following options:   Non-invasive Prenatal Testing  (NIPT)    Maternal plasma cell-free DNA testing; first trimester ultrasound with nuchal translucency and nasal bone assessment is recommended, when appropriate    Screens for fetal trisomy 21, trisomy 13, trisomy 18, and sex chromosome aneuploidy    Cannot screen for open neural tube defects; maternal serum AFP after 15 weeks is recommended     Comprehensive (Level II) ultrasound: Detailed ultrasound performed between 18-22 weeks gestation to screen for major birth defects and markers for aneuploidy.      Chorionic villus sampling (CVS)    Invasive procedure typically performed in the first trimester by which placental villi are obtained for the purpose of chromosome analysis and/or other prenatal genetic analysis    Diagnostic results; >99% sensitivity for fetal chromosome abnormalities    Cannot test for open neural tube defects; maternal serum AFP after 15 weeks is recommended     Genetic Amniocentesis    Invasive procedure typically performed in the second trimester by which amniotic fluid is obtained for the purpose of chromosome analysis and/or other prenatal genetic analysis    Diagnostic results; >99% sensitivity for fetal chromosome abnormalities    AFAFP measurement tests for open neural tube defects       We reviewed the benefits and limitations of this testing.  Screening tests provide a risk assessment specific to the pregnancy for certain fetal chromosome abnormalities, but cannot definitively diagnose or exclude a fetal chromosome abnormality.  Follow-up genetic counseling and consideration of diagnostic testing is recommended with any abnormal screening result. Diagnostic tests carry inherent risks- including risk of miscarriage- that require careful consideration.  These tests can detect fetal chromosome abnormalities with greater than 99% certainty.  There is no screening nor diagnostic test that can detect all forms of birth defects or mental disability.    It was a pleasure to be involved with  Kendal lennon. Face-to-face time of the meeting was 15 minutes.    Danuta Zamarripa MS, [facility]  Maternal Fetal Medicine  [facility]  Phone:[phone]  Email: [email or web address]

## 2017-03-17 NOTE — MR AVS SNAPSHOT
After Visit Summary   3/17/2017    Kendal Miranda    MRN: [id number]           Patient Information     Date Of Birth          1985        Visit Information        Provider Department      3/17/2017 3:00 PM Danuta Zamarripa GC [facility] Maternal Fetal Medicine [facility]        Today's Diagnoses     Abnormal biochemical finding on  screening of mother    -  1    Abnormal findings on  screening           Follow-ups after your visit        Your next 10 appointments already scheduled     Mar 17, 2017  3:45 PM CDT   LAB with  LAB ONLY   [facility] ([facility])    [address]  [facility] [id number] [phone]           Patient must bring picture ID.  Patient should be prepared to give a urine specimen  Please do not eat 10-12 hours before your appointment if you are coming in fasting for labs on lipids, cholesterol, or glucose (sugar).  Pregnant women should follow their Care Team instructions. Water with medications is okay. Do not drink coffee or other fluids.   If you have concerns about taking  your medications, please ask at office or if scheduling via [employer], send a message by clicking on Secure Messaging, Message Your Care Team.            2017  8:00 AM CDT   M US COMP with [id number]   [facility] Maternal Fetal Medicine Ultrasound - [facility])    [address]  [facility] [address] [phone]           Wear comfortable clothes and leave your valuables at home.            2017  8:30 AM CDT   Radiology MD with  ANGELICA CHILEL   [facility] Maternal Fetal Medicine [facility])    [address] [facility] [address]  [facility] [address] [phone]           Please arrive at the time given for your first appointment.  This visit is used internally to schedule the physician's time during your ultrasound.              Who to contact     If you have questions or need  follow up information about today's clinic visit or your schedule please contact [facility] MATERNAL FETAL MEDICINE [facility] directly at [phone].  Normal or non-critical lab and imaging results will be communicated to you by MyChart, letter or phone within 4 business days after the clinic has received the results. If you do not hear from us within 7 days, please contact the clinic through [employer] or phone. If you have a critical or abnormal lab result, we will notify you by phone as soon as possible.  Submit refill requests through [employer] or call your pharmacy and they will forward the refill request to us. Please allow 3 business days for your refill to be completed.          Additional Information About Your Visit        [employer] Information     [employer] gives you secure access to your electronic health record. If you see a primary care provider, you can also send messages to your care team and make appointments. If you have questions, please call your primary care clinic.  If you do not have a primary care provider, please call [phone] and they will assist you.        Care EveryWhere ID     This is your Care EveryWhere ID. This could be used by other organizations to access your [place] medical records  [id number]        Your Vitals Were     Last Period                   12/15/2016 (Exact Date)            Blood Pressure from Last 3 Encounters:   03/01/17 108/64   02/24/17 110/60   01/11/17 110/70    Weight from Last 3 Encounters:   03/01/17 78.6 kg (173 lb 3.2 oz)   02/24/17 78 kg (172 lb)   01/11/17 76.2 kg (167 lb 14.4 oz)              We Performed the Following     [facility] Genetic Counseling        Primary Care Provider Office Phone # Fax #    Susan Rachele Haase, APRN [phone]       [address] [address]        Thank you!     Thank you for choosing [facility] MATERNAL FETAL MEDICINE [facility]  for your care. Our goal is always to provide you  with excellent care. Hearing back from our patients is one way we can continue to improve our services. Please take a few minutes to complete the written survey that you may receive in the mail after your visit with us. Thank you!             Your Updated Medication List - Protect others around you: Learn how to safely use, store and throw away your medicines at www.disposemymeds.org.          This list is accurate as of: 3/17/17  3:30 PM.  Always use your most recent med list.                   Brand Name Dispense Instructions for use    CVS SALINE 4 Soln          loratadine 10 MG tablet    CLARITIN    30 tablet    Take 1 tablet (10 mg) by mouth daily

## 2017-03-22 LAB — LAB SCANNED RESULT: NORMAL

## 2017-03-23 ENCOUNTER — TELEPHONE (OUTPATIENT)
Dept: MATERNAL FETAL MEDICINE | Facility: CLINIC | Age: 32
End: 2017-03-23

## 2017-03-23 NOTE — TELEPHONE ENCOUNTER
Called and discussed normal NIPT results with Kendal. Results indicate NO ANEUPLOIDY DETECTED for chromosomes 21, 18, 13, or sex chromosomes (XX). Fetal sex was disclosed. This puts her current pregnancy at low risk for Down syndrome, trisomy 18, trisomy 13 and sex chromosome abnormalities. This test is reported to have the following sensitivities: Down syndrome- 99%, trisomy 18- 98%, and trisomy 13- 98%. Although these results are reassuring, this does not replace a standard chromosome analysis from a chorionic villus sampling or amniocentesis. MSAFP is the appropriate second trimester screening test for open neural tube defects; the maternal quad screen is not recommended. Her results are available in her Epic chart for her primary OB to review.    Danuta Zamarripa MS, [facility]  Maternal Fetal Medicine  [phone]

## 2017-03-24 LAB — LAB SCANNED RESULT: NORMAL

## 2017-04-28 ENCOUNTER — TELEPHONE (OUTPATIENT)
Dept: NURSING | Facility: CLINIC | Age: 32
End: 2017-04-28

## 2017-04-28 ENCOUNTER — OFFICE VISIT (OUTPATIENT)
Dept: MATERNAL FETAL MEDICINE | Facility: CLINIC | Age: 32
End: 2017-04-28
Attending: OBSTETRICS & GYNECOLOGY
Payer: COMMERCIAL

## 2017-04-28 ENCOUNTER — HOSPITAL ENCOUNTER (OUTPATIENT)
Dept: ULTRASOUND IMAGING | Facility: CLINIC | Age: 32
Discharge: HOME | End: 2017-04-28
Attending: OBSTETRICS & GYNECOLOGY | Admitting: OBSTETRICS & GYNECOLOGY
Payer: COMMERCIAL

## 2017-04-28 DIAGNOSIS — O28.9 ABNORMAL 1ST TRIMESTER SCREEN: Primary | ICD-10-CM

## 2017-04-28 DIAGNOSIS — O26.90 PREGNANCY RELATED CONDITION, UNSPECIFIED TRIMESTER: ICD-10-CM

## 2017-04-28 PROCEDURE — 76811 OB US DETAILED SNGL FETUS: CPT

## 2017-04-28 NOTE — MR AVS SNAPSHOT
After Visit Summary   4/28/2017    Kendal Miranda    MRN: [id number]           Patient Information     Date Of Birth          1985        Visit Information        Provider Department      4/28/2017 8:30 AM Mallory Rabago MD [facility]        Today's Diagnoses     Abnormal 1st trimester screen    -  1       Follow-ups after your visit        Your next 10 appointments already scheduled     May 12, 2017  3:45 PM CDT   ESTABLISHED PRENATAL with Naa Ashley MD   [facility]    [address] [facility] [address] [phone]            Jun 09, 2017  3:45 PM CDT   ESTABLISHED PRENATAL with Naa Ashley MD   [facility] ([facility]    [address] [facility] [address] [phone]              Who to contact     If you have questions or need follow up information about today's clinic visit or your schedule please contact [facility] MATERNAL FETAL MEDICINE [facility] directly at [phone].  Normal or non-critical lab and imaging results will be communicated to you by [employer], letter or phone within 4 business days after the clinic has received the results. If you do not hear from us within 7 days, please contact the clinic through [employer] or phone. If you have a critical or abnormal lab result, we will notify you by phone as soon as possible.  Submit refill requests through [employer] or call your pharmacy and they will forward the refill request to us. Please allow 3 business days for your refill to be completed.          Additional Information About Your Visit        [employer] Information     [employer] gives you secure access to your electronic health record. If you see a primary care provider, you can also send messages to your care team and make appointments. If you have questions, please call your primary care clinic.  If you do not have  a primary care provider, please call [phone] and they will assist you.        Care EveryWhere ID     This is your Care EveryWhere ID. This could be used by other organizations to access your [place] medical records  [id number]        Your Vitals Were     Last Period                   12/15/2016 (Exact Date)            Blood Pressure from Last 3 Encounters:   03/01/17 108/64   02/24/17 110/60   01/11/17 110/70    Weight from Last 3 Encounters:   03/01/17 78.6 kg (173 lb 3.2 oz)   02/24/17 78 kg (172 lb)   01/11/17 76.2 kg (167 lb 14.4 oz)              Today, you had the following     No orders found for display       Primary Care Provider Office Phone # Fax #    Susan Rachele Haase, APRN [phone]       [address] [address]        Thank you!     Thank you for choosing [facility]  for your care. Our goal is always to provide you with excellent care. Hearing back from our patients is one way we can continue to improve our services. Please take a few minutes to complete the written survey that you may receive in the mail after your visit with us. Thank you!             Your Updated Medication List - Protect others around you: Learn how to safely use, store and throw away your medicines at www.disposemymeds.org.          This list is accurate as of: 4/28/17  9:22 AM.  Always use your most recent med list.                   Brand Name Dispense Instructions for use    CVS SALINE 4 Soln          loratadine 10 MG tablet    CLARITIN    30 tablet    Take 1 tablet (10 mg) by mouth daily

## 2017-05-12 ENCOUNTER — PRENATAL OFFICE VISIT (OUTPATIENT)
Dept: OBGYN | Facility: CLINIC | Age: 32
End: 2017-05-12
Payer: COMMERCIAL

## 2017-05-12 VITALS
BODY MASS INDEX: 29.12 KG/M2 | TEMPERATURE: 97.9 F | DIASTOLIC BLOOD PRESSURE: 62 MMHG | HEIGHT: 66 IN | WEIGHT: 181.2 LBS | HEART RATE: 81 BPM | SYSTOLIC BLOOD PRESSURE: 102 MMHG | RESPIRATION RATE: 16 BRPM

## 2017-05-12 DIAGNOSIS — Z98.890 S/P LEEP (LOOP ELECTROSURGICAL EXCISION PROCEDURE): ICD-10-CM

## 2017-05-12 DIAGNOSIS — Z34.02 ENCOUNTER FOR SUPERVISION OF NORMAL FIRST PREGNANCY IN SECOND TRIMESTER: Primary | ICD-10-CM

## 2017-05-12 PROCEDURE — 99207 ZZC PRENATAL VISIT: CPT | Performed by: OBSTETRICS & GYNECOLOGY

## 2017-05-12 PROCEDURE — 87624 HPV HI-RISK TYP POOLED RSLT: CPT | Performed by: OBSTETRICS & GYNECOLOGY

## 2017-05-12 PROCEDURE — G0145 SCR C/V CYTO,THINLAYER,RESCR: HCPCS | Performed by: OBSTETRICS & GYNECOLOGY

## 2017-05-12 RX ORDER — FAMOTIDINE 20 MG
TABLET ORAL
COMMUNITY

## 2017-05-12 NOTE — MR AVS SNAPSHOT
After Visit Summary   5/12/2017    Kendal Miranda    MRN: [id number]           Patient Information     Date Of Birth          1985        Visit Information        Provider Department      5/12/2017 3:45 PM Naa Ashley MD [facility]        Today's Diagnoses     Encounter for supervision of normal first pregnancy in second trimester    -  1       Follow-ups after your visit        Your next 10 appointments already scheduled     Jun 09, 2017  3:45 PM CDT   ESTABLISHED PRENATAL with Naa Ashley MD   [facility] ([facility])    [address] [place] [id number] [phone]              Who to contact     If you have questions or need follow up information about today's clinic visit or your schedule please contact [facility] directly at [phone].  Normal or non-critical lab and imaging results will be communicated to you by [employer], letter or phone within 4 business days after the clinic has received the results. If you do not hear from us within 7 days, please contact the clinic through [employer] or phone. If you have a critical or abnormal lab result, we will notify you by phone as soon as possible.  Submit refill requests through [employer] or call your pharmacy and they will forward the refill request to us. Please allow 3 business days for your refill to be completed.          Additional Information About Your Visit        MyChart Information     [employer] gives you secure access to your electronic health record. If you see a primary care provider, you can also send messages to your care team and make appointments. If you have questions, please call your primary care clinic.  If you do not have a primary care provider, please call [phone] and they will assist you.        Care EveryWhere ID     This is your Care EveryWhere ID. This could be used by other organizations to access your [place]  "medical records  [id number]        Your Vitals Were     Pulse Temperature Respirations Height Last Period BMI (Body Mass Index)    81 97.9  F (36.6  C) (Oral) 16 5' 5.75\" (1.67 m) 12/15/2016 (Exact Date) 29.47 kg/m2       Blood Pressure from Last 3 Encounters:   05/12/17 102/62   03/01/17 108/64   02/24/17 110/60    Weight from Last 3 Encounters:   05/12/17 181 lb 3.2 oz (82.2 kg)   03/01/17 173 lb 3.2 oz (78.6 kg)   02/24/17 172 lb (78 kg)              Today, you had the following     No orders found for display       Primary Care Provider Office Phone # Fax #    Susan Rachele Haase, APRN [phone]       [address] [address]        Thank you!     Thank you for choosing [facility]  for your care. Our goal is always to provide you with excellent care. Hearing back from our patients is one way we can continue to improve our services. Please take a few minutes to complete the written survey that you may receive in the mail after your visit with us. Thank you!             Your Updated Medication List - Protect others around you: Learn how to safely use, store and throw away your medicines at www.disposemymeds.org.          This list is accurate as of: 5/12/17  4:11 PM.  Always use your most recent med list.                   Brand Name Dispense Instructions for use    loratadine 10 MG tablet    CLARITIN    30 tablet    Take 1 tablet (10 mg) by mouth daily       PRENATAL COMPLETE 14-0.4 MG Tabs            "

## 2017-05-12 NOTE — NURSING NOTE
"Chief Complaint   Patient presents with     Prenatal Care     21 weeks 1 day- has general concerns- heartburn, lowerback pain       Initial /62 (BP Location: Right arm, Patient Position: Chair, Cuff Size: Adult Regular)  Pulse 81  Temp 97.9  F (36.6  C) (Oral)  Resp 16  Ht 5' 5.75\" (1.67 m)  Wt 181 lb 3.2 oz (82.2 kg)  LMP 12/15/2016 (Exact Date)  BMI 29.47 kg/m2 Estimated body mass index is 29.47 kg/(m^2) as calculated from the following:    Height as of this encounter: 5' 5.75\" (1.67 m).    Weight as of this encounter: 181 lb 3.2 oz (82.2 kg).  Medication Reconciliation: complete     Arie Ybarra CMA      "

## 2017-05-12 NOTE — PROGRESS NOTES
PROBLEM LIST  LABS: Opos/RI    1. Abnormal 1TM screen with normal Verifi: 46 XX. Normal level II US.    Doing well. Constipated and also heartburn, managing well. No questions. Pap and HPV today. If both are negative, follow up with both in 3 years.    Naa Ashley MD

## 2017-05-16 LAB
COPATH REPORT: NORMAL
PAP: NORMAL

## 2017-05-19 LAB
FINAL DIAGNOSIS: NORMAL
HPV HR 12 DNA CVX QL NAA+PROBE: NEGATIVE
HPV16 DNA SPEC QL NAA+PROBE: NEGATIVE
HPV18 DNA SPEC QL NAA+PROBE: NEGATIVE
SPECIMEN DESCRIPTION: NORMAL

## 2017-06-16 ENCOUNTER — PRENATAL OFFICE VISIT (OUTPATIENT)
Dept: OBGYN | Facility: CLINIC | Age: 32
End: 2017-06-16
Payer: COMMERCIAL

## 2017-06-16 VITALS
BODY MASS INDEX: 30.46 KG/M2 | TEMPERATURE: 97.9 F | SYSTOLIC BLOOD PRESSURE: 102 MMHG | DIASTOLIC BLOOD PRESSURE: 52 MMHG | WEIGHT: 187.3 LBS

## 2017-06-16 DIAGNOSIS — Z33.1 PREGNANT STATE, INCIDENTAL: Primary | ICD-10-CM

## 2017-06-16 LAB
ERYTHROCYTE [DISTWIDTH] IN BLOOD BY AUTOMATED COUNT: 13.2 % (ref 10–15)
HCT VFR BLD AUTO: 35.9 % (ref 35–47)
HGB BLD-MCNC: 12.5 G/DL (ref 11.7–15.7)
MCH RBC QN AUTO: 31.9 PG (ref 26.5–33)
MCHC RBC AUTO-ENTMCNC: 34.8 G/DL (ref 31.5–36.5)
MCV RBC AUTO: 92 FL (ref 78–100)
PLATELET # BLD AUTO: 207 10E9/L (ref 150–450)
RBC # BLD AUTO: 3.92 10E12/L (ref 3.8–5.2)
WBC # BLD AUTO: 12.3 10E9/L (ref 4–11)

## 2017-06-16 PROCEDURE — 85027 COMPLETE CBC AUTOMATED: CPT | Performed by: OBSTETRICS & GYNECOLOGY

## 2017-06-16 PROCEDURE — 86780 TREPONEMA PALLIDUM: CPT | Performed by: OBSTETRICS & GYNECOLOGY

## 2017-06-16 PROCEDURE — 99207 ZZC PRENATAL VISIT: CPT | Performed by: OBSTETRICS & GYNECOLOGY

## 2017-06-16 PROCEDURE — 82950 GLUCOSE TEST: CPT | Performed by: OBSTETRICS & GYNECOLOGY

## 2017-06-16 PROCEDURE — 36415 COLL VENOUS BLD VENIPUNCTURE: CPT | Performed by: OBSTETRICS & GYNECOLOGY

## 2017-06-16 NOTE — MR AVS SNAPSHOT
After Visit Summary   6/16/2017    Kendal Miranda    MRN: [id number]           Patient Information     Date Of Birth          1985        Visit Information        Provider Department      6/16/2017 3:45 PM Nam Kenney MD [facility]        Today's Diagnoses     Pregnant state, incidental    -  1       Follow-ups after your visit        Follow-up notes from your care team     Return in about 4 weeks (around 7/14/2017).      Your next 10 appointments already scheduled     Jul 14, 2017  2:00 PM CDT   ESTABLISHED PRENATAL with Naa Ashley MD   [facility] ([facility])    [id number] [place] [address]   [phone]            Jul 27, 2017  3:30 PM CDT   ESTABLISHED PRENATAL with Naa Ashley MD   [facility] ([facility]    [address]  [facility] [address]   [phone]            Aug 10, 2017  3:45 PM CDT   ESTABLISHED PRENATAL with Naa Ashley MD   [facility] ([facility])    [address]  [facility] [address]   [phone]            Aug 24, 2017  3:45 PM CDT   ESTABLISHED PRENATAL with Naa Ashley MD   [facility] ([facility])    [address]  [facility] [id number] [phone]            Aug 31, 2017  3:45 PM CDT   ESTABLISHED PRENATAL with Naa Ashley MD   [facility])    [address] [place]  [facility] [address] [phone]              Who to contact     If you have questions or need follow up information about today's clinic visit or your schedule please contact [facility] directly at [phone].  Normal or non-critical lab and imaging results will be communicated to you by MyChart, letter or phone within 4 business days after the clinic has received the results. If you  do not hear from us within 7 days, please contact the clinic through [employer] or phone. If you have a critical or abnormal lab result, we will notify you by phone as soon as possible.  Submit refill requests through [employer] or call your pharmacy and they will forward the refill request to us. Please allow 3 business days for your refill to be completed.          Additional Information About Your Visit        [employer] Information     [employer] gives you secure access to your electronic health record. If you see a primary care provider, you can also send messages to your care team and make appointments. If you have questions, please call your primary care clinic.  If you do not have a primary care provider, please call [phone] and they will assist you.        Care EveryWhere ID     This is your Care EveryWhere ID. This could be used by other organizations to access your [place] medical records  [id number]        Your Vitals Were     Temperature Last Period Breastfeeding? BMI (Body Mass Index)          97.9  F (36.6  C) (Oral) 12/15/2016 (Exact Date) No 30.46 kg/m2         Blood Pressure from Last 3 Encounters:   06/16/17 102/52   05/12/17 102/62   03/01/17 108/64    Weight from Last 3 Encounters:   06/16/17 187 lb 4.8 oz (85 kg)   05/12/17 181 lb 3.2 oz (82.2 kg)   03/01/17 173 lb 3.2 oz (78.6 kg)              We Performed the Following     Anti Treponema     CBC with platelets     Glucose tolerance, gest screen, 1 hour        Primary Care Provider Office Phone # Fax #    Susan Rachele Haase, APRN [phone]       [address] [address]        Thank you!     Thank you for choosing [facility]  for your care. Our goal is always to provide you with excellent care. Hearing back from our patients is one way we can continue to improve our services. Please take a few minutes to complete the written survey that you may receive in the mail  after your visit with us. Thank you!             Your Updated Medication List - Protect others around you: Learn how to safely use, store and throw away your medicines at www.disposemymeds.org.          This list is accurate as of: 6/16/17 11:59 PM.  Always use your most recent med list.                   Brand Name Dispense Instructions for use    loratadine 10 MG tablet    CLARITIN    30 tablet    Take 1 tablet (10 mg) by mouth daily       PRENATAL COMPLETE 14-0.4 MG Tabs

## 2017-06-16 NOTE — NURSING NOTE
"Chief Complaint   Patient presents with     Prenatal Care     26weeks 1day       Initial /52  Temp 97.9  F (36.6  C) (Oral)  Wt 187 lb 4.8 oz (85 kg)  LMP 12/15/2016 (Exact Date)  Breastfeeding? No  BMI 30.46 kg/m2 Estimated body mass index is 30.46 kg/(m^2) as calculated from the following:    Height as of 5/12/17: 5' 5.75\" (1.67 m).    Weight as of this encounter: 187 lb 4.8 oz (85 kg).  Medication Reconciliation: complete   Denise Rueda MA    "

## 2017-06-17 LAB — GLUCOSE 1H P 50 G GLC PO SERPL-MCNC: 120 MG/DL (ref 60–129)

## 2017-06-18 LAB — T PALLIDUM IGG+IGM SER QL: NEGATIVE

## 2017-06-19 PROBLEM — Z33.1 PREGNANT STATE, INCIDENTAL: Status: ACTIVE | Noted: 2017-06-19

## 2017-07-11 ENCOUNTER — PRENATAL OFFICE VISIT (OUTPATIENT)
Dept: OBGYN | Facility: CLINIC | Age: 32
End: 2017-07-11
Payer: COMMERCIAL

## 2017-07-11 VITALS
WEIGHT: 189 LBS | SYSTOLIC BLOOD PRESSURE: 110 MMHG | BODY MASS INDEX: 30.74 KG/M2 | HEART RATE: 80 BPM | DIASTOLIC BLOOD PRESSURE: 72 MMHG | TEMPERATURE: 97.9 F

## 2017-07-11 DIAGNOSIS — Z34.03 NORMAL FIRST PREGNANCY IN THIRD TRIMESTER: Primary | ICD-10-CM

## 2017-07-11 PROCEDURE — 99207 ZZC PRENATAL VISIT: CPT | Performed by: OBSTETRICS & GYNECOLOGY

## 2017-07-11 RX ORDER — BREAST PUMP
1 EACH MISCELLANEOUS PRN
Qty: 1 EACH | Refills: 0 | Status: SHIPPED | OUTPATIENT
Start: 2017-07-11

## 2017-07-11 NOTE — PROGRESS NOTES
CC: Kendal Miranda is a 31 year old who presents for routine prenatal visit @ 29w5d       HPI: Patient states that RUQ pain, bilateral edema, and headache developed yesterday. She describes the RUQ pain as mild and sharp and states that it comes and goes. Patient notes that standing, walking, or drinking water does not alleviate the pain. Pain is worse when sitting. She was at work yesterday when she noticed the symptoms. Patient reports the swelling has resolved today. She has a standing desk at work that she uses often. She took 1 Tylenol for the headache, then resolved. Patient inquires about normal contractions during pregnancy, possibility of having another ultrasound, fetal presentation, and normal fetal movement. Patient reports walking for exercise and eating plenty of vegetables. She also inquires about milk intake. Denies vaginal bleeding, regular contractions, loss of fluid; +fetal movement reported.         This document serves as a record of the services and decisions personally performed and made by Verónica Engle MD. It was created on her behalf by Dali Carroll, a trained medical scribe. The creation of this document is based the provider's statements to the medical scribe.  Dali Carroll 2017 9:34 AM    Exam:   See OB flowsheet  ABDOMEN: mid-right abdomen, minimally tender on exam, gravid, soft, no masses.      ASSESSMENT/PLAN  Kendal Miranda is a 31 year old   @ 29w5d     1)  Return to clinic in 2 weeks  2)  BP and weight ok- no concerns  3)  Reassured swelling is normal during pregnancy and is not of concern at this time as her BP has been within normal range. Advised patient to shift weight and activate muscles periodically while standing for extended periods of time, drink plenty of water, and elevate feet on her lunch break or when at home as needed for swelling. Discussed use of compression stockings if swelling becomes an issue.   4)  Discussed typical contractions, signs  of  labor, and normal changes in fetal presentation, and normal fetal movement  5)  Breast pump ordered today  6)  Patient has registered for birthing classes and hospital tour  7)  Patient plans to use her PCP for her baby     The information in this document, created by the medical scribe for me, accurately reflects the services I personally performed and the decisions made by me. I have reviewed and approved this document for accuracy prior to leaving the patient care area.  2017 9:34 AM      Verónica Engle M.D.

## 2017-07-11 NOTE — NURSING NOTE
"Chief Complaint   Patient presents with     Prenatal Care   29w5d  Yesterday c/o HA / Rt upper abd discomfort / ankle swelling  Discuss movements    Initial /72 (BP Location: Left arm, Patient Position: Sitting, Cuff Size: Adult Large)  Pulse 80  Temp 97.9  F (36.6  C) (Oral)  Wt 189 lb (85.7 kg)  LMP 12/15/2016 (Exact Date)  BMI 30.74 kg/m2 Estimated body mass index is 30.74 kg/(m^2) as calculated from the following:    Height as of 5/12/17: 5' 5.75\" (1.67 m).    Weight as of this encounter: 189 lb (85.7 kg).  Medication Reconciliation: complete    "

## 2017-07-11 NOTE — PATIENT INSTRUCTIONS
Call the clinic ([facility] [phone], [place] [phone]) right away with any of the following concerns:    1.   Regular tightening, cramping or contractions every 10 minutes, that do not go away with rest and hydration    2.   Vaginal bleeding.    3.   Leaking fluid of any amount, or suspicion of ruptured membranes    4.   Decreased movement of your baby.      Continue your prenatal vitamins daily.    Please call with any additional concerns that your have, and continue your prenatal visits every two weeks!

## 2017-07-11 NOTE — MR AVS SNAPSHOT
After Visit Summary   7/11/2017    Kendal Miranda    MRN: [id number]           Patient Information     Date Of Birth          1985        Visit Information        Provider Department      7/11/2017 9:15 AM Verónica Engle MD [facility]        Today's Diagnoses     Normal first pregnancy in third trimester    -  1      Care Instructions    Call the clinic ([place] [phone], [place] [phone]) right away with any of the following concerns:    1.   Regular tightening, cramping or contractions every 10 minutes, that do not go away with rest and hydration    2.   Vaginal bleeding.    3.   Leaking fluid of any amount, or suspicion of ruptured membranes    4.   Decreased movement of your baby.      Continue your prenatal vitamins daily.    Please call with any additional concerns that your have, and continue your prenatal visits every two weeks!          Follow-ups after your visit        Your next 10 appointments already scheduled     Jul 27, 2017  2:15 PM CDT   ESTABL[facility] PR[facility]AT[facility] with Naa Ashley [facility]   [facility]    [address]  [facility] [address] [phone]            Aug 08, 2017  3:45 PM CDT   ESTABLISHED PRENATAL with Naa Ashley MD   [facility]    [address]  [facility] [id number] [phone]              Who to contact     If you have questions or need follow up information about today's clinic visit or your schedule please contact [facility] directly at [phone].  Normal or non-critical lab and imaging results will be communicated to you by MyChart, letter or phone within 4 business days after the clinic has received the results. If you do not hear from us within 7 days, please contact the clinic through MyChart or phone. If you have a critical or abnormal lab result, we will notify you by phone as soon as  possible.  Submit refill requests through Yasuu or call your pharmacy and they will forward the refill request to us. Please allow 3 business days for your refill to be completed.          Additional Information About Your Visit        CalAmphart Information     Yasuu gives you secure access to your electronic health record. If you see a primary care provider, you can also send messages to your care team and make appointments. If you have questions, please call your primary care clinic.  If you do not have a primary care provider, please call [phone] and they will assist you.        Care EveryWhere ID     This is your Care EveryWhere ID. This could be used by other organizations to access your [place] medical records  [id number]        Your Vitals Were     Pulse Temperature Last Period BMI (Body Mass Index)          80 97.9  F (36.6  C) (Oral) 12/15/2016 (Exact Date) 30.74 kg/m2         Blood Pressure from Last 3 Encounters:   07/11/17 110/72   06/16/17 102/52   05/12/17 102/62    Weight from Last 3 Encounters:   07/11/17 189 lb (85.7 kg)   06/16/17 187 lb 4.8 oz (85 kg)   05/12/17 181 lb 3.2 oz (82.2 kg)              Today, you had the following     No orders found for display       Primary Care Provider Office Phone # Fax #    Susan Rachele Haase, APRN [phone]       [address] [address]        Equal Access to Services     YAHAIRA RAMESH : Hadii kirti ku hadasho Soomaali, waaxda luqadaha, qaybta kaalmada adeegyada, waxAdcrowd retargeting chris singh . So [facility] [phone].    ATENCIÓN: Si habla español, tiene a sal disposición servicios gratuitos de asistencia lingüística. Llstarr al [phone].    We comply with applicable federal civil rights laws and [place] laws. We do not discriminate on the basis of race, color, national origin, age, disability sex, sexual orientation or gender identity.            Thank you!     Thank you for  choosing [facility]  for your care. Our goal is always to provide you with excellent care. Hearing back from our patients is one way we can continue to improve our services. Please take a few minutes to complete the written survey that you may receive in the mail after your visit with us. Thank you!             Your Updated Medication List - Protect others around you: Learn how to safely use, store and throw away your medicines at www.disposemymeds.org.          This list is accurate as of: 7/11/17  9:40 AM.  Always use your most recent med list.                   Brand Name Dispense Instructions for use Diagnosis    loratadine 10 MG tablet    CLARITIN    30 tablet    Take 1 tablet (10 mg) by mouth daily    Upper respiratory tract infection, unspecified type       PRENATAL COMPLETE 14-0.4 MG Tabs

## 2017-07-27 ENCOUNTER — PRENATAL OFFICE VISIT (OUTPATIENT)
Dept: OBGYN | Facility: CLINIC | Age: 32
End: 2017-07-27
Payer: COMMERCIAL

## 2017-07-27 VITALS — SYSTOLIC BLOOD PRESSURE: 112 MMHG | BODY MASS INDEX: 31.06 KG/M2 | DIASTOLIC BLOOD PRESSURE: 66 MMHG | WEIGHT: 191 LBS

## 2017-07-27 DIAGNOSIS — Z34.03 ENCOUNTER FOR SUPERVISION OF NORMAL FIRST PREGNANCY IN THIRD TRIMESTER: Primary | ICD-10-CM

## 2017-07-27 PROCEDURE — 99207 ZZC PRENATAL VISIT: CPT | Performed by: OBSTETRICS & GYNECOLOGY

## 2017-07-27 PROCEDURE — 90471 IMMUNIZATION ADMIN: CPT | Performed by: OBSTETRICS & GYNECOLOGY

## 2017-07-27 PROCEDURE — 90715 TDAP VACCINE 7 YRS/> IM: CPT | Performed by: OBSTETRICS & GYNECOLOGY

## 2017-07-27 NOTE — PROGRESS NOTES
PROBLEM LIST  LABS: Opos/RI    1. Abnormal 1TM screen with normal Verifi: 46 XX. Normal level II US.    Doing well, no concerns. Reviewed signs and symptoms of PTL, when to call, etc. Follow up in 2 weeks.    Naa Ashley MD

## 2017-07-27 NOTE — MR AVS SNAPSHOT
After Visit Summary   7/27/2017    Kendal Miranda    MRN: [id number]           Patient Information     Date Of Birth          1985        Visit Information        Provider Department      7/27/2017 2:15 PM Naa Ashley MD [facility]        Today's Diagnoses     Encounter for supervision of normal first pregnancy in third trimester    -  1       Follow-ups after your visit        Your next 10 appointments already scheduled     Aug 08, 2017  3:45 PM CDT   ESTABLISHED PRENATAL with Naa Ashley MD   [facility] ([facility]    [address]  [facility] [id number] [phone]              Who to contact     If you have questions or need follow up information about today's clinic visit or your schedule please contact [facility] directly at [phone].  Normal or non-critical lab and imaging results will be communicated to you by MyChart, letter or phone within 4 business days after the clinic has received the results. If you do not hear from us within 7 days, please contact the clinic through MyChart or phone. If you have a critical or abnormal lab result, we will notify you by phone as soon as possible.  Submit refill requests through [employer] or call your pharmacy and they will forward the refill request to us. Please allow 3 business days for your refill to be completed.          Additional Information About Your Visit        MyChart Information     [employer] gives you secure access to your electronic health record. If you see a primary care provider, you can also send messages to your care team and make appointments. If you have questions, please call your primary care clinic.  If you do not have a primary care provider, please call [phone] and they will assist you.        Care EveryWhere ID     This is your Care EveryWhere ID. This could be used by other organizations to access your [facility]  records  [id number]        Your Vitals Were     Last Period BMI (Body Mass Index)                12/15/2016 (Exact Date) 31.06 kg/m2           Blood Pressure from Last 3 Encounters:   07/27/17 112/66   07/11/17 110/72   06/16/17 102/52    Weight from Last 3 Encounters:   07/27/17 191 lb (86.6 kg)   07/11/17 189 lb (85.7 kg)   06/16/17 187 lb 4.8 oz (85 kg)              We Performed the Following     TDAP VACCINE (ADACEL)        Primary Care Provider Office Phone # Fax #    Lindy Rachele Haase, APRN [phone]       [address] [address]        Equal Access to Services     YAHAIRA RAMESH : Hadii aad ku hadasho Soomaali, waaxda luqadaha, qaybta kaalmada adeegyada, bg perez haymelissa singh . So [facility] [phone].    ATENCIÓN: Si habla español, tiene a sal disposición servicios gratuitos de asistencia lingüística. Llame al [phone].    We comply with applicable federal civil rights laws and [place] laws. We do not discriminate on the basis of race, color, national origin, age, disability sex, sexual orientation or gender identity.            Thank you!     Thank you for choosing [facility]  for your care. Our goal is always to provide you with excellent care. Hearing back from our patients is one way we can continue to improve our services. Please take a few minutes to complete the written survey that you may receive in the mail after your visit with us. Thank you!             Your Updated Medication List - Protect others around you: Learn how to safely use, store and throw away your medicines at www.disposemymeds.org.          This list is accurate as of: 7/27/17  9:12 PM.  Always use your most recent med list.                   Brand Name Dispense Instructions for use Diagnosis    breast pump Misc     1 each    1 each as needed    Normal first pregnancy in third trimester       loratadine 10 MG tablet    CLARITIN     30 tablet    Take 1 tablet (10 mg) by mouth daily    Upper respiratory tract infection, unspecified type       PRENATAL COMPLETE 14-0.4 MG Tabs

## 2017-07-27 NOTE — NURSING NOTE
"Chief Complaint   Patient presents with     Prenatal Care       Initial /66 (BP Location: Left arm, Patient Position: Sitting, Cuff Size: Adult Regular)  Wt 191 lb (86.6 kg)  LMP 12/15/2016 (Exact Date)  BMI 31.06 kg/m2 Estimated body mass index is 31.06 kg/(m^2) as calculated from the following:    Height as of 5/12/17: 5' 5.75\" (1.67 m).    Weight as of this encounter: 191 lb (86.6 kg).  Medication Reconciliation: complete     32w0d  + FM daily  + BH contractions  - cramping or bleeding  + headache - Tylenol PRN  + heartburn - TUMS helpful  Sherin Leigh LPN      "

## 2017-08-08 ENCOUNTER — PRENATAL OFFICE VISIT (OUTPATIENT)
Dept: OBGYN | Facility: CLINIC | Age: 32
End: 2017-08-08
Payer: COMMERCIAL

## 2017-08-08 VITALS
WEIGHT: 192 LBS | SYSTOLIC BLOOD PRESSURE: 118 MMHG | DIASTOLIC BLOOD PRESSURE: 60 MMHG | BODY MASS INDEX: 31.23 KG/M2 | HEART RATE: 76 BPM

## 2017-08-08 DIAGNOSIS — Z34.03 ENCOUNTER FOR SUPERVISION OF NORMAL FIRST PREGNANCY IN THIRD TRIMESTER: Primary | ICD-10-CM

## 2017-08-08 PROCEDURE — 99207 ZZC PRENATAL VISIT: CPT | Performed by: OBSTETRICS & GYNECOLOGY

## 2017-08-08 NOTE — MR AVS SNAPSHOT
After Visit Summary   8/8/2017    Kendal Miranda    MRN: [id number]           Patient Information     Date Of Birth          1985        Visit Information        Provider Department      8/8/2017 3:45 PM Naa Ashley MD [facility]        Today's Diagnoses     Encounter for supervision of normal first pregnancy in third trimester    -  1       Follow-ups after your visit        Who to contact     If you have questions or need follow up information about today's clinic visit or your schedule please contact [facility] directly at [phone].  Normal or non-critical lab and imaging results will be communicated to you by GodTubehart, letter or phone within 4 business days after the clinic has received the results. If you do not hear from us within 7 days, please contact the clinic through Codewarst or phone. If you have a critical or abnormal lab result, we will notify you by phone as soon as possible.  Submit refill requests through [employer] or call your pharmacy and they will forward the refill request to us. Please allow 3 business days for your refill to be completed.          Additional Information About Your Visit        MyChart Information     [employer] gives you secure access to your electronic health record. If you see a primary care provider, you can also send messages to your care team and make appointments. If you have questions, please call your primary care clinic.  If you do not have a primary care provider, please call [phone] and they will assist you.        Care EveryWhere ID     This is your Care EveryWhere ID. This could be used by other organizations to access your [place] medical records  [id number]        Your Vitals Were     Pulse Last Period Breastfeeding? BMI (Body Mass Index)          76 12/15/2016 (Exact Date) No 31.23 kg/m2         Blood Pressure from Last 3 Encounters:   08/08/17 118/60   07/27/17 112/66   07/11/17 110/72    Weight  from Last 3 Encounters:   08/08/17 192 lb (87.1 kg)   07/27/17 191 lb (86.6 kg)   07/11/17 189 lb (85.7 kg)              Today, you had the following     No orders found for display       Primary Care Provider Office Phone # Fax #    Susan Rachele Haase, APRN [phone]       [address] [facility] [address]        Equal Access to Services     YAHAIRA RAMESH : Hadii aad ku hadasho Soomaali, waaxda luqadaha, qaybta kaalmada adeegyada, waxay idiin hayaan adeeg kharareg lakajal . So [facility] [phone].    ATENCIÓN: Si habla español, tiene a sal disposición servicios gratuitos de asistencia lingüística. Llame al [phone].    We comply with applicable federal civil rights laws and [place] laws. We do not discriminate on the basis of race, color, national origin, age, disability sex, sexual orientation or gender identity.            Thank you!     Thank you for choosing [facility]  for your care. Our goal is always to provide you with excellent care. Hearing back from our patients is one way we can continue to improve our services. Please take a few minutes to complete the written survey that you may receive in the mail after your visit with us. Thank you!             Your Updated Medication List - Protect others around you: Learn how to safely use, store and throw away your medicines at www.disposemymeds.org.          This list is accurate as of: 8/8/17 11:59 PM.  Always use your most recent med list.                   Brand Name Dispense Instructions for use Diagnosis    breast pump Misc     1 each    1 each as needed    Normal first pregnancy in third trimester       loratadine 10 MG tablet    CLARITIN    30 tablet    Take 1 tablet (10 mg) by mouth daily    Upper respiratory tract infection, unspecified type       PRENATAL COMPLETE 14-0.4 MG Tabs

## 2017-08-08 NOTE — PROGRESS NOTES
PROBLEM LIST  LABS: Opos/RI    1. Abnormal 1TM screen with normal Verifi: 46 XX. Normal level II US.    Doing well, no concerns other than some R hip pain since the weekend, related to walking a lot. Reviewed signs and symptoms of PTL, when to call, etc. Follow up in 2 weeks.    Naa Ashley MD

## 2017-08-08 NOTE — NURSING NOTE
"Chief Complaint   Patient presents with     Prenatal Care       Initial /60 (BP Location: Right arm, Patient Position: Sitting, Cuff Size: Adult Regular)  Pulse 76  Wt 192 lb (87.1 kg)  LMP 12/15/2016 (Exact Date)  Breastfeeding? No  BMI 31.23 kg/m2 Estimated body mass index is 31.23 kg/(m^2) as calculated from the following:    Height as of 5/12/17: 5' 5.75\" (1.67 m).    Weight as of this encounter: 192 lb (87.1 kg).  Medication Reconciliation: complete     33w5d  + FM daily  + BH contractions occas.  + R hip pain  - cramping or bleeding   - headache   Sherin Leigh LPN      "

## 2017-08-24 ENCOUNTER — PRENATAL OFFICE VISIT (OUTPATIENT)
Dept: OBGYN | Facility: CLINIC | Age: 32
End: 2017-08-24
Payer: COMMERCIAL

## 2017-08-24 VITALS — SYSTOLIC BLOOD PRESSURE: 110 MMHG | BODY MASS INDEX: 31.45 KG/M2 | WEIGHT: 193.4 LBS | DIASTOLIC BLOOD PRESSURE: 76 MMHG

## 2017-08-24 DIAGNOSIS — Z34.03 ENCOUNTER FOR SUPERVISION OF NORMAL FIRST PREGNANCY IN THIRD TRIMESTER: Primary | ICD-10-CM

## 2017-08-24 PROCEDURE — 99207 ZZC PRENATAL VISIT: CPT | Performed by: OBSTETRICS & GYNECOLOGY

## 2017-08-24 PROCEDURE — 87653 STREP B DNA AMP PROBE: CPT | Performed by: OBSTETRICS & GYNECOLOGY

## 2017-08-24 NOTE — MR AVS SNAPSHOT
After Visit Summary   8/24/2017    Kendal Miranda    MRN: [id number]           Patient Information     Date Of Birth          1985        Visit Information        Provider Department      8/24/2017 10:15 AM Naa Ashley MD [facility]        Today's Diagnoses     Encounter for supervision of normal first pregnancy in third trimester    -  1       Follow-ups after your visit        Your next 10 appointments already scheduled     Aug 31, 2017  2:30 PM CDT   ESTABLISHED PRENATAL with Naa Ashley MD   [facility]    [address] [place]  [facility] [id number] [phone]            Sep 07, 2017 11:30 AM CDT   ESTABLISHED PRENATAL with Naa Ashley MD   [facility]    [address] [place]  [facility] [id number] [phone]              Who to contact     If you have questions or need follow up information about today's clinic visit or your schedule please contact [facility] directly at [phone].  Normal or non-critical lab and imaging results will be communicated to you by Yappnhart, letter or phone within 4 business days after the clinic has received the results. If you do not hear from us within 7 days, please contact the clinic through MesoCoatt or phone. If you have a critical or abnormal lab result, we will notify you by phone as soon as possible.  Submit refill requests through [employer] or call your pharmacy and they will forward the refill request to us. Please allow 3 business days for your refill to be completed.          Additional Information About Your Visit        Yappnhart Information     [employer] gives you secure access to your electronic health record. If you see a primary care provider, you can also send messages to your care team and make appointments. If you have questions, please call your primary care clinic.  If you do not have a  primary care provider, please call [phone] and they will assist you.        Care EveryWhere ID     This is your Care EveryWhere ID. This could be used by other organizations to access your [facility] medical records  [id number]        Your Vitals Were     Last Period BMI (Body Mass Index)                12/15/2016 (Exact Date) 31.45 kg/m2           Blood Pressure from Last 3 Encounters:   08/24/17 110/76   08/08/17 118/60   07/27/17 112/66    Weight from Last 3 Encounters:   08/24/17 193 lb 6.4 oz (87.7 kg)   08/08/17 192 lb (87.1 kg)   07/27/17 191 lb (86.6 kg)              We Performed the Following     Group B strep PCR        Primary Care Provider Office Phone # Fax #    Lindy Young Haase, APRN [phone]       [address] [facility] [address]        Equal Access to Services     YAHAIRA RAMESH : Hadii aad ku hadasho Soomaali, waaxda luqadaha, qaybta kaalmada adeegyada, waxay mishelin haymelissa singh . So [facility] [phone].    ATENCIÓN: Si oliverla espmehdi, tiene a sal disposición servicios gratuitos de asistencia lingüística. Llame al [phone].    We comply with applicable federal civil rights laws and [place] laws. We do not discriminate on the basis of race, color, national origin, age, disability sex, sexual orientation or gender identity.            Thank you!     Thank you for choosing [facility]  for your care. Our goal is always to provide you with excellent care. Hearing back from our patients is one way we can continue to improve our services. Please take a few minutes to complete the written survey that you may receive in the mail after your visit with us. Thank you!             Your Updated Medication List - Protect others around you: Learn how to safely use, store and throw away your medicines at www.disposemymeds.org.          This list is accurate as of: 8/24/17 12:04 PM.  Always use your most recent med list.                   Brand Name  Dispense Instructions for use Diagnosis    breast pump Misc     1 each    1 each as needed    Normal first pregnancy in third trimester       loratadine 10 MG tablet    CLARITIN    30 tablet    Take 1 tablet (10 mg) by mouth daily    Upper respiratory tract infection, unspecified type       PRENATAL COMPLETE 14-0.4 MG Tabs

## 2017-08-24 NOTE — NURSING NOTE
"Chief Complaint   Patient presents with     Prenatal Care     36 weeks 0 days, due for group b strep.      Abdominal Pain     upper abdomen, patient reports pain started  this morning. Patient states it feels like she \"ate something weird\" No nausea/vomiting, diarrhea       Initial /76 (BP Location: Right arm, Patient Position: Chair, Cuff Size: Adult Regular)  Wt 193 lb 6.4 oz (87.7 kg)  LMP 12/15/2016 (Exact Date)  BMI 31.45 kg/m2 Estimated body mass index is 31.45 kg/(m^2) as calculated from the following:    Height as of 5/12/17: 5' 5.75\" (1.67 m).    Weight as of this encounter: 193 lb 6.4 oz (87.7 kg).  Medication Reconciliation: complete     Arie Ybarra CMA      "

## 2017-08-24 NOTE — PROGRESS NOTES
PROBLEM LIST  LABS: Opos/RI    1. Abnormal 1TM screen with normal Verifi: 46 XX. Normal level II US.    Doing well overall, no concerns. Discussed signs and symptoms of labor, has prereg and taken classes. Group B Strep today. Follow up weekly.  Naa Ashley MD

## 2017-08-25 LAB
GP B STREP DNA SPEC QL NAA+PROBE: POSITIVE
SPECIMEN SOURCE: ABNORMAL

## 2017-08-25 PROCEDURE — 87186 SC STD MICRODIL/AGAR DIL: CPT | Performed by: OBSTETRICS & GYNECOLOGY

## 2017-08-28 LAB
BACTERIA SPEC CULT: ABNORMAL
SPECIMEN SOURCE: ABNORMAL

## 2017-08-31 ENCOUNTER — PRENATAL OFFICE VISIT (OUTPATIENT)
Dept: OBGYN | Facility: CLINIC | Age: 32
End: 2017-08-31
Payer: COMMERCIAL

## 2017-08-31 VITALS
BODY MASS INDEX: 31.88 KG/M2 | DIASTOLIC BLOOD PRESSURE: 78 MMHG | WEIGHT: 196 LBS | HEART RATE: 80 BPM | SYSTOLIC BLOOD PRESSURE: 124 MMHG

## 2017-08-31 DIAGNOSIS — Z34.03 ENCOUNTER FOR SUPERVISION OF NORMAL FIRST PREGNANCY IN THIRD TRIMESTER: Primary | ICD-10-CM

## 2017-08-31 PROCEDURE — 99207 ZZC PRENATAL VISIT: CPT | Performed by: OBSTETRICS & GYNECOLOGY

## 2017-08-31 NOTE — PROGRESS NOTES
PROBLEM LIST  LABS: Opos/RI/GBS positive     1. Abnormal 1TM screen with normal Verifi: 46 XX. Normal level II US.    Doing well overall, no concerns. Discussed positive Group B Strep. Signs and symptoms of labor reviewed.  Naa Ashley MD

## 2017-08-31 NOTE — MR AVS SNAPSHOT
After Visit Summary   8/31/2017    Kendal Miranda    MRN: [id number]           Patient Information     Date Of Birth          1985        Visit Information        Provider Department      8/31/2017 2:30 PM Naa Ashley MD [facility]        Today's Diagnoses     Encounter for supervision of normal first pregnancy in third trimester    -  1       Follow-ups after your visit        Your next 10 appointments already scheduled     Sep 07, 2017 11:30 AM CDT   ESTABLISHED PRENATAL with Naa Ashley MD   [facility] ([facility])    [address]  [facility] [id number] [phone]              Who to contact     If you have questions or need follow up information about today's clinic visit or your schedule please contact [facility] directly at [phone].  Normal or non-critical lab and imaging results will be communicated to you by MyChart, letter or phone within 4 business days after the clinic has received the results. If you do not hear from us within 7 days, please contact the clinic through MyChart or phone. If you have a critical or abnormal lab result, we will notify you by phone as soon as possible.  Submit refill requests through Regalister or call your pharmacy and they will forward the refill request to us. Please allow 3 business days for your refill to be completed.          Additional Information About Your Visit        MyChart Information     [employer] gives you secure access to your electronic health record. If you see a primary care provider, you can also send messages to your care team and make appointments. If you have questions, please call your primary care clinic.  If you do not have a primary care provider, please call [phone] and they will assist you.        Care EveryWhere ID     This is your Care EveryWhere ID. This could be used by other organizations to access your [facility]  records  [id number]        Your Vitals Were     Pulse Last Period Breastfeeding? BMI (Body Mass Index)          80 12/15/2016 (Exact Date) No 31.88 kg/m2         Blood Pressure from Last 3 Encounters:   08/31/17 124/78   08/24/17 110/76   08/08/17 118/60    Weight from Last 3 Encounters:   08/31/17 196 lb (88.9 kg)   08/24/17 193 lb 6.4 oz (87.7 kg)   08/08/17 192 lb (87.1 kg)              Today, you had the following     No orders found for display       Primary Care Provider Office Phone # Fax #    Susan Rachele Haase, APRN [phone]       [address] [facility] [address]        Equal Access to Services     YAHAIRA RAMESH : Hadii kirti clementso Soomaali, waaxda luqadaha, qaybta kaalmada adeegyada, bg singh . So [facility] [phone].    ATENCIÓN: Si habla español, tiene a sal disposición servicios gratuitos de asistencia lingüística. Llame al [phone].    We comply with applicable federal civil rights laws and [place] laws. We do not discriminate on the basis of race, color, national origin, age, disability sex, sexual orientation or gender identity.            Thank you!     Thank you for choosing [facility]  for your care. Our goal is always to provide you with excellent care. Hearing back from our patients is one way we can continue to improve our services. Please take a few minutes to complete the written survey that you may receive in the mail after your visit with us. Thank you!             Your Updated Medication List - Protect others around you: Learn how to safely use, store and throw away your medicines at www.disposemymeds.org.          This list is accurate as of: 8/31/17  6:56 PM.  Always use your most recent med list.                   Brand Name Dispense Instructions for use Diagnosis    breast pump Misc     1 each    1 each as needed    Normal first pregnancy in third trimester       loratadine 10 MG tablet    CLARITIN     30 tablet    Take 1 tablet (10 mg) by mouth daily    Upper respiratory tract infection, unspecified type       PRENATAL COMPLETE 14-0.4 MG Tabs

## 2017-08-31 NOTE — NURSING NOTE
"Chief Complaint   Patient presents with     Prenatal Care   37w0d  + FM daily  - cramping, bleeding, contractions or leaking fluid  + HB  + headache occas.  - edema   Sherin Leigh LPN      Initial /78 (BP Location: Right arm, Patient Position: Sitting, Cuff Size: Adult Regular)  Pulse 80  Wt 196 lb (88.9 kg)  LMP 12/15/2016 (Exact Date)  Breastfeeding? No  BMI 31.88 kg/m2 Estimated body mass index is 31.88 kg/(m^2) as calculated from the following:    Height as of 5/12/17: 5' 5.75\" (1.67 m).    Weight as of this encounter: 196 lb (88.9 kg).  Medication Reconciliation: complete    "

## 2017-09-07 ENCOUNTER — PRENATAL OFFICE VISIT (OUTPATIENT)
Dept: OBGYN | Facility: CLINIC | Age: 32
End: 2017-09-07
Payer: COMMERCIAL

## 2017-09-07 VITALS
SYSTOLIC BLOOD PRESSURE: 118 MMHG | DIASTOLIC BLOOD PRESSURE: 70 MMHG | HEART RATE: 72 BPM | WEIGHT: 196 LBS | BODY MASS INDEX: 31.88 KG/M2

## 2017-09-07 DIAGNOSIS — Z34.03 ENCOUNTER FOR SUPERVISION OF NORMAL FIRST PREGNANCY IN THIRD TRIMESTER: Primary | ICD-10-CM

## 2017-09-07 DIAGNOSIS — Z23 NEED FOR PROPHYLACTIC VACCINATION AND INOCULATION AGAINST INFLUENZA: ICD-10-CM

## 2017-09-07 PROCEDURE — 99207 ZZC PRENATAL VISIT: CPT | Performed by: OBSTETRICS & GYNECOLOGY

## 2017-09-07 PROCEDURE — 90686 IIV4 VACC NO PRSV 0.5 ML IM: CPT | Performed by: OBSTETRICS & GYNECOLOGY

## 2017-09-07 PROCEDURE — 90471 IMMUNIZATION ADMIN: CPT | Performed by: OBSTETRICS & GYNECOLOGY

## 2017-09-07 NOTE — NURSING NOTE
"Chief Complaint   Patient presents with     Prenatal Care       Initial /70 (BP Location: Right arm, Patient Position: Sitting, Cuff Size: Adult Regular)  Pulse 72  Wt 196 lb (88.9 kg)  LMP 12/15/2016 (Exact Date)  Breastfeeding? No  BMI 31.88 kg/m2 Estimated body mass index is 31.88 kg/(m^2) as calculated from the following:    Height as of 5/12/17: 5' 5.75\" (1.67 m).    Weight as of this encounter: 196 lb (88.9 kg).  Medication Reconciliation: complete     38w0d  + FM daily  + nl discharge  - bleeding or leaking of fluid  + random BH contractions  + cramping   + low back pain and hips  Sherin Leigh LPN        "

## 2017-09-07 NOTE — MR AVS SNAPSHOT
After Visit Summary   9/7/2017    Kendal Miranda    MRN: [id number]           Patient Information     Date Of Birth          1985        Visit Information        Provider Department      9/7/2017 11:30 AM Naa Ashley MD [facility]        Today's Diagnoses     Encounter for supervision of normal first pregnancy in third trimester    -  1    Need for prophylactic vaccination and inoculation against influenza           Follow-ups after your visit        Your next 10 appointments already scheduled     Sep 14, 2017 11:00 AM CDT   ESTABLISHED PRENATAL with Naa Ashley MD   [facility] ([facility]    [address]  [facility] [id number] [phone]            Sep 21, 2017  1:45 PM CDT   ESTABLISHED PRENATAL with Naa Ashley MD   [facility] ([facility])    [address]  [facility] [id number] [phone]              Who to contact     If you have questions or need follow up information about today's clinic visit or your schedule please contact [facility] directly at [phone].  Normal or non-critical lab and imaging results will be communicated to you by Sequencehart, letter or phone within 4 business days after the clinic has received the results. If you do not hear from us within 7 days, please contact the clinic through ChemiSenset or phone. If you have a critical or abnormal lab result, we will notify you by phone as soon as possible.  Submit refill requests through [employer] or call your pharmacy and they will forward the refill request to us. Please allow 3 business days for your refill to be completed.          Additional Information About Your Visit        Sequencehart Information     [employer] gives you secure access to your electronic health record. If you see a primary care provider, you can also send messages to your care team and make appointments. If you have  questions, please call your primary care clinic.  If you do not have a primary care provider, please call [phone] and they will assist you.        Care EveryWhere ID     This is your Care EveryWhere ID. This could be used by other organizations to access your [place] medical records  [id number]        Your Vitals Were     Pulse Last Period Breastfeeding? BMI (Body Mass Index)          72 12/15/2016 (Exact Date) No 31.88 kg/m2         Blood Pressure from Last 3 Encounters:   09/07/17 118/70   08/31/17 124/78   08/24/17 110/76    Weight from Last 3 Encounters:   09/07/17 196 lb (88.9 kg)   08/31/17 196 lb (88.9 kg)   08/24/17 193 lb 6.4 oz (87.7 kg)              We Performed the Following     FLU VAC, SPLIT VIRUS IM > 3 YO (QUADRIVALENT) [92557]     Vaccine Administration, Initial [70209]        Primary Care Provider Office Phone # Fax #    Susan Rachele Haase, APRN [phone] [address] [facility] [address]        Equal Access to Services     YAHAIRA RAMESH : Hadii aad ku hadasho Soomaali, waaxda luqadaha, qaybta kaalmada adeegyada, bg singh . So [facility] [phone].    ATENCIÓN: Si habla español, tiene a sal disposición servicios gratuitos de asistencia lingüística. [facility] [phone].    We comply with applicable federal civil rights laws and [place] laws. We do not discriminate on the basis of race, color, national origin, age, disability sex, sexual orientation or gender identity.            Thank you!     Thank you for choosing [facility]  for your care. Our goal is always to provide you with excellent care. Hearing back from our patients is one way we can continue to improve our services. Please take a few minutes to complete the written survey that you may receive in the mail after your visit with us. Thank you!             Your Updated Medication List - Protect others around you: Learn how to safely use, store and throw  away your medicines at www.disposemymeds.org.          This list is accurate as of: 9/7/17  2:41 PM.  Always use your most recent med list.                   Brand Name Dispense Instructions for use Diagnosis    breast pump Misc     1 each    1 each as needed    Normal first pregnancy in third trimester       loratadine 10 MG tablet    CLARITIN    30 tablet    Take 1 tablet (10 mg) by mouth daily    Upper respiratory tract infection, unspecified type       PRENATAL COMPLETE 14-0.4 MG Tabs

## 2017-09-07 NOTE — PROGRESS NOTES
PROBLEM LIST  LABS: Opos/RI/GBS positive     1. Abnormal 1TM screen with normal Verifi: 46 XX. Normal level II US.    Doing well, signs and symptoms of labor reviewed again. No concerns.  Naa Ashley MD

## 2017-09-07 NOTE — PROGRESS NOTES
Injectable Influenza Immunization Documentation    1.  Is the person to be vaccinated sick today?  No    2. Does the person to be vaccinated have an allergy to eggs or to a component of the vaccine?  No    3. Has the person to be vaccinated today ever had a serious reaction to influenza vaccine in the past?  No    4. Has the person to be vaccinated ever had Guillain-Odell syndrome?  No     Form completed by Sherin Leigh LPN

## 2017-09-14 ENCOUNTER — PRENATAL OFFICE VISIT (OUTPATIENT)
Dept: OBGYN | Facility: CLINIC | Age: 32
End: 2017-09-14
Payer: COMMERCIAL

## 2017-09-14 VITALS
BODY MASS INDEX: 31.6 KG/M2 | DIASTOLIC BLOOD PRESSURE: 70 MMHG | SYSTOLIC BLOOD PRESSURE: 134 MMHG | HEIGHT: 66 IN | WEIGHT: 196.6 LBS | HEART RATE: 100 BPM

## 2017-09-14 DIAGNOSIS — Z34.03 ENCOUNTER FOR SUPERVISION OF NORMAL FIRST PREGNANCY IN THIRD TRIMESTER: Primary | ICD-10-CM

## 2017-09-14 PROCEDURE — 99207 ZZC PRENATAL VISIT: CPT | Performed by: OBSTETRICS & GYNECOLOGY

## 2017-09-14 NOTE — MR AVS SNAPSHOT
After Visit Summary   9/14/2017    Kendal Miranda    MRN: [id number]           Patient Information     Date Of Birth          1985        Visit Information        Provider Department      9/14/2017 11:00 AM Naa Ashley MD [facility]        Today's Diagnoses     Encounter for supervision of normal first pregnancy in third trimester    -  1       Follow-ups after your visit        Your next 10 appointments already scheduled     Sep 21, 2017  1:45 PM CDT   ESTABLISHED PRENATAL with Naa Ashley MD   [facility] ([facility])    [address]  [facility] [phone] [phone]              Who to contact     If you have questions or need follow up information about today's clinic visit or your schedule please contact [facility] directly at [phone].  Normal or non-critical lab and imaging results will be communicated to you by MyChart, letter or phone within 4 business days after the clinic has received the results. If you do not hear from us within 7 days, please contact the clinic through MyChart or phone. If you have a critical or abnormal lab result, we will notify you by phone as soon as possible.  Submit refill requests through Bo[employer]town! or call your pharmacy and they will forward the refill request to us. Please allow 3 business days for your refill to be completed.          Additional Information About Your Visit        MyChart Information     [employer] gives you secure access to your electronic health record. If you see a primary care provider, you can also send messages to your care team and make appointments. If you have questions, please call your primary care clinic.  If you do not have a primary care provider, please call [phone] and they will assist you.        Care EveryWhere ID     This is your Care EveryWhere ID. This could be used by other organizations to access your [facility]  "records  [id number]        Your Vitals Were     Pulse Height Last Period BMI (Body Mass Index)          100 5' 5.5\" (1.664 m) 12/15/2016 (Exact Date) 32.22 kg/m2         Blood Pressure from Last 3 Encounters:   09/14/17 134/70   09/07/17 118/70   08/31/17 124/78    Weight from Last 3 Encounters:   09/14/17 196 lb 9.6 oz (89.2 kg)   09/07/17 196 lb (88.9 kg)   08/31/17 196 lb (88.9 kg)              Today, you had the following     No orders found for display       Primary Care Provider Office Phone # Fax #    Susan Rachele Haase, APRN [phone] [address] [facility] [address]        Equal Access to Services     YAHAIRA RAMESH : Hadii aad ku hadasho Sojace, waaxda luqadaha, qaybta kaalmada adechrisyada, bg singh . So [facility] [phone].    ATENCIÓN: Si habla español, tiene a sal disposición servicios gratuitos de asistencia lingüística. Diane al [phone].    We comply with applicable federal civil rights laws and [place] laws. We do not discriminate on the basis of race, color, national origin, age, disability sex, sexual orientation or gender identity.            Thank you!     Thank you for choosing [facility]  for your care. Our goal is always to provide you with excellent care. Hearing back from our patients is one way we can continue to improve our services. Please take a few minutes to complete the written survey that you may receive in the mail after your visit with us. Thank you!             Your Updated Medication List - Protect others around you: Learn how to safely use, store and throw away your medicines at www.disposemymeds.org.          This list is accurate as of: 9/14/17 11:29 AM.  Always use your most recent med list.                   Brand Name Dispense Instructions for use Diagnosis    breast pump Misc     1 each    1 each as needed    Normal first pregnancy in third trimester       loratadine 10 MG tablet    " CLARITIN    30 tablet    Take 1 tablet (10 mg) by mouth daily    Upper respiratory tract infection, unspecified type       PRENATAL COMPLETE 14-0.4 MG Tabs

## 2017-09-14 NOTE — NURSING NOTE
"Chief Complaint   Patient presents with     Prenatal Care     39+0       Initial /70  Pulse 100  Ht 5' 5.5\" (1.664 m)  Wt 196 lb 9.6 oz (89.2 kg)  LMP 12/15/2016 (Exact Date)  BMI 32.22 kg/m2 Estimated body mass index is 32.22 kg/(m^2) as calculated from the following:    Height as of this encounter: 5' 5.5\" (1.664 m).    Weight as of this encounter: 196 lb 9.6 oz (89.2 kg).  BP completed using cuff size: regular        The following HM Due: NONE      The following patient reported/Care Every where data was sent to:  P ABSTRACT QUALITY INITIATIVES [20977]  NA     patient has appointment for today    Dior FRIEDMAN               "

## 2017-09-17 ENCOUNTER — HOSPITAL ENCOUNTER (OUTPATIENT)
Facility: CLINIC | Age: 32
Discharge: HOME | End: 2017-09-17
Attending: OBSTETRICS & GYNECOLOGY | Admitting: OBSTETRICS & GYNECOLOGY
Payer: COMMERCIAL

## 2017-09-17 ENCOUNTER — NURSE TRIAGE (OUTPATIENT)
Dept: NURSING | Facility: CLINIC | Age: 32
End: 2017-09-17

## 2017-09-17 VITALS
SYSTOLIC BLOOD PRESSURE: 137 MMHG | DIASTOLIC BLOOD PRESSURE: 81 MMHG | RESPIRATION RATE: 18 BRPM | WEIGHT: 196 LBS | HEIGHT: 66 IN | TEMPERATURE: 98 F | BODY MASS INDEX: 31.5 KG/M2

## 2017-09-17 LAB — A1 MICROGLOB PLACENTAL VAG QL: NEGATIVE

## 2017-09-17 PROCEDURE — 99214 OFFICE O/P EST MOD 30 MIN: CPT | Mod: 25

## 2017-09-17 PROCEDURE — 84112 EVAL AMNIOTIC FLUID PROTEIN: CPT | Performed by: OBSTETRICS & GYNECOLOGY

## 2017-09-17 PROCEDURE — 59025 FETAL NON-STRESS TEST: CPT

## 2017-09-17 NOTE — IP AVS SNAPSHOT
MRN:[id number]                      After Visit Summary   9/17/2017    Kendal Miranda    MRN: [id number]           Thank you!     Thank you for choosing [facility] for your care. Our goal is always to provide you with excellent care. Hearing back from our patients is one way we can continue to improve our services. Please take a few minutes to complete the written survey that you may receive in the mail after you visit. If you would like to speak to someone directly about your visit please contact Patient Relations at [phone]. Thank you!          Patient Information     Date Of Birth          1985        About your hospital stay     You were admitted on:  September 17, 2017 You last received care in the:  [facility] Delivery    You were discharged on:  September 17, 2017       Who to Call     For medical emergencies, please call 911.  For non-urgent questions about your medical care, please call your primary care provider or clinic, [phone]          Attending Provider     Provider Specialty    Nam Kenney MD OB/Gyn       Primary Care Provider Office Phone # Fax #    Lindy Rachele Haase, APRN [phone]      Your next 10 appointments already scheduled     Sep 21, 2017  1:45 PM CDT   ESTABLISHED PRENATAL with Naa Ashley MD   [facility] ([facility])    [facility] [address]  [facility] [id number] [phone]              Further instructions from your care team       Discharge Instruction for Undelivered Patients      You were seen for: Membrane Assessment  We Consulted: Dr Kenney  You had (Test or Medicine):amnisure negative, cervix unchanged, monitoring     Diet:   Drink 8 to 12 glasses of liquids (milk, juice, water) every day.  You may eat meals and snacks.     Activity:  Call your doctor or nurse midwife if your baby is moving less than usual.     Call your provider if you  "notice:  Swelling in your face or increased swelling in your hands or legs.  Headaches that are not relieved by Tylenol (acetaminophen).  Changes in your vision (blurring: seeing spots or stars.)  Nausea (sick to your stomach) and vomiting (throwing up).   Weight gain of 5 pounds or more per week.  Heartburn that doesn't go away.  Signs of bladder infection: pain when you urinate (use the toilet), need to go more often and more urgently.  The bag of godinez (rupture of membranes) breaks, or you notice leaking in your underwear.  Bright red blood in your underwear.  Abdominal (lower belly) or stomach pain.  For first baby: Contractions (tightening) less than 5 minutes apart for one hour or more.  Increase or change in vaginal discharge (note the color and amount)  Other: .    Follow-up:  As scheduled in the clinic          Pending Results     No orders found from 9/15/2017 to 9/18/2017.            Admission Information     Date & Time Provider Department Dept. Phone    9/17/2017 Nam Kenney MD [facility] Labor and Delivery [phone]      Your Vitals Were     Blood Pressure Temperature Respirations Height Weight Last Period    137/81 98  F (36.7  C) (Oral) 18 1.664 m (5' 5.5\") 88.9 kg (196 lb) 12/15/2016 (Exact Date)    BMI (Body Mass Index)                   32.12 kg/m2           MyChart Information     [employer] gives you secure access to your electronic health record. If you see a primary care provider, you can also send messages to your care team and make appointments. If you have questions, please call your primary care clinic.  If you do not have a primary care provider, please call [phone] and they will assist you.        Care EveryWhere ID     This is your Care EveryWhere ID. This could be used by other organizations to access your [place] medical records  [id number]        Equal Access to Services     YAHAIRA RAMESH AH: Sharad Sue, rashaad long, angel ag " bg gutierrezchris ayala'aan ah. So [facility] [phone].    ATENCIÓN: Si habla dinora, tiene a sal disposición servicios gratuitos de asistencia lingüística. Diane al [phone].    We comply with applicable federal civil rights laws and [place] laws. We do not discriminate on the basis of race, color, national origin, age, disability sex, sexual orientation or gender identity.               Review of your medicines      UNREVIEWED medicines. Ask your doctor about these medicines        Dose / Directions    loratadine 10 MG tablet   Commonly known as:  CLARITIN   Used for:  Upper respiratory tract infection, unspecified type        Dose:  10 mg   Take 1 tablet (10 mg) by mouth daily   Quantity:  30 tablet   Refills:  1       PRENATAL COMPLETE 14-0.4 MG Tabs        Refills:  0         CONTINUE these medicines which have NOT CHANGED        Dose / Directions    breast pump Misc   Used for:  Normal first pregnancy in third trimester        Dose:  1 each   1 each as needed   Quantity:  1 each   Refills:  0                Protect others around you: Learn how to safely use, store and throw away your medicines at www.disposemymeds.org.             Medication List: This is a list of all your medications and when to take them. Check marks below indicate your daily home schedule. Keep this list as a reference.      Medications           Morning Afternoon Evening Bedtime As Needed    breast pump Misc   1 each as needed                                loratadine 10 MG tablet   Commonly known as:  CLARITIN   Take 1 tablet (10 mg) by mouth daily                                PRENATAL COMPLETE 14-0.4 MG Tabs

## 2017-09-17 NOTE — DISCHARGE INSTRUCTIONS
Discharge Instruction for Undelivered Patients      You were seen for: Membrane Assessment  We Consulted: Dr Kenney  You had (Test or Medicine):amnisure negative, cervix unchanged, monitoring     Diet:   Drink 8 to 12 glasses of liquids (milk, juice, water) every day.  You may eat meals and snacks.     Activity:  Call your doctor or nurse midwife if your baby is moving less than usual.     Call your provider if you notice:  Swelling in your face or increased swelling in your hands or legs.  Headaches that are not relieved by Tylenol (acetaminophen).  Changes in your vision (blurring: seeing spots or stars.)  Nausea (sick to your stomach) and vomiting (throwing up).   Weight gain of 5 pounds or more per week.  Heartburn that doesn't go away.  Signs of bladder infection: pain when you urinate (use the toilet), need to go more often and more urgently.  The bag of godinez (rupture of membranes) breaks, or you notice leaking in your underwear.  Bright red blood in your underwear.  Abdominal (lower belly) or stomach pain.  For first baby: Contractions (tightening) less than 5 minutes apart for one hour or more.  Increase or change in vaginal discharge (note the color and amount)  Other: .    Follow-up:  As scheduled in the clinic

## 2017-09-17 NOTE — PROVIDER NOTIFICATION
09/17/17 1639   Provider Notification   Provider Name/Title Pablito   Method of Notification Phone   Request Evaluate - Remote   Notification Reason Patient Arrived;Membrane Status;Uterine Activity;SVE   ok for d/c home

## 2017-09-17 NOTE — IP AVS SNAPSHOT
[facility] Labor and Delivery    [address]    [facility] [address]    Phone:  [phone]    Fax:  [phone]                                       After Visit Summary   9/17/2017    Kendal Miranda    MRN: [id number]           After Visit Summary Signature Page     I have received my discharge instructions, and my questions have been answered. I have discussed any challenges I see with this plan with the nurse or doctor.    ..........................................................................................................................................  Patient/Patient Representative Signature      ..........................................................................................................................................  Patient Representative Print Name and Relationship to Patient    ..................................................               ................................................  Date                                            Time    ..........................................................................................................................................  Reviewed by Signature/Title    ...................................................              ..............................................  Date                                                            Time

## 2017-09-17 NOTE — PLAN OF CARE
Data: [facility] for evaluation of SROM.  Cervical exam 2/70/-2.  Membranes intact.  Contractions irregular.  Action:  Presumed adequate fetal oxygenation documented (see flow record). Discharge instructions reviewed.  Patient instructed to report change in fetal movement, vaginal leaking of fluid or bleeding, abdominal pain, or any concerns related to the pregnancy to her nurse/physician.   Response: Orders to discharge home per Dr. Kenney.  Patient verbalized understanding of education and verbalized agreement with plan. Patient discharged to home ambulatory at 1655.

## 2017-09-17 NOTE — TELEPHONE ENCOUNTER
"  Reason for Disposition    Leakage of fluid from vagina    Additional Information    Negative: [1] SEVERE abdominal pain (e.g., excruciating) AND [2] constant AND [3] present > 1 hour    Negative: Severe bleeding (e.g., continuous red blood from vagina, or large blood clots)    Negative: Umbilical cord hanging out of the vagina (shiny, white, curled appearance, \"like telephone cord\")    Negative: Uncontrollable urge to push (i.e., feels like baby is coming out now)    Negative: Can see baby    Negative: Sounds like a life-threatening emergency to the triager    Negative: < 20 weeks pregnant    Negative: Vaginal bleeding    Protocols used: PREGNANCY - RUPTURE OF MEMBRANES-ADULT-AH  39 1/2 weeks. Membranes stripped on 9/14/2017. Leaking small amount of clear liquid vaginally today. Baby moving well. Instructed to go in. Is delivering at [facility]. OB nurse station called. I gave Alejandra the above information.  Carole ROSAS RN [facility]     "

## 2017-09-19 PROCEDURE — 59025 FETAL NON-STRESS TEST: CPT | Mod: 26 | Performed by: OBSTETRICS & GYNECOLOGY

## 2017-09-21 ENCOUNTER — PRENATAL OFFICE VISIT (OUTPATIENT)
Dept: OBGYN | Facility: CLINIC | Age: 32
End: 2017-09-21
Payer: COMMERCIAL

## 2017-09-21 VITALS
BODY MASS INDEX: 31.66 KG/M2 | HEART RATE: 68 BPM | DIASTOLIC BLOOD PRESSURE: 70 MMHG | SYSTOLIC BLOOD PRESSURE: 124 MMHG | WEIGHT: 197 LBS | HEIGHT: 66 IN

## 2017-09-21 DIAGNOSIS — Z34.03 ENCOUNTER FOR SUPERVISION OF NORMAL FIRST PREGNANCY IN THIRD TRIMESTER: Primary | ICD-10-CM

## 2017-09-21 PROCEDURE — 99207 ZZC PRENATAL VISIT: CPT | Performed by: OBSTETRICS & GYNECOLOGY

## 2017-09-21 NOTE — PROGRESS NOTES
PROBLEM LIST  LABS: Opos/RI/GBS positive     1. Abnormal 1TM screen with normal Verifi: 46 XX. Normal level II US.    Seen in L&D Sunday to rule out rupture of membranes, Amnisure negative. Cramping. Discussed risks, benefits, and alternatives to induction of labor at 41 weeks if not delivered by then. Scheduled on L&D. Signs and symptoms of labor reviewed again today.  Naa Ashley MD

## 2017-09-21 NOTE — NURSING NOTE
"Chief Complaint   Patient presents with     Prenatal Care       Initial /70 (BP Location: Right arm, Patient Position: Sitting, Cuff Size: Adult Regular)  Pulse 68  Ht 5' 5.5\" (1.664 m)  Wt 197 lb (89.4 kg)  LMP 12/15/2016 (Exact Date)  Breastfeeding? No  BMI 32.28 kg/m2 Estimated body mass index is 32.28 kg/(m^2) as calculated from the following:    Height as of this encounter: 5' 5.5\" (1.664 m).    Weight as of this encounter: 197 lb (89.4 kg).  Medication Reconciliation: complete     40w0d. L&D on Sunday to rule out rupture.  + FM daily  + bleeding after membranes stripped  + contractions  - edema  Sherin Leigh LPN      "

## 2017-09-21 NOTE — NURSING NOTE
Induction smart phrase   Procedure: Induction  Date: 09/28/2017  Time: 7:30am arrival  Hospital: [facility]  Orders faxed and the patient was advised to call [facility] [phone] labor and delivery department one hour prior to arrival.  Sherin Leigh LPN

## 2017-09-22 ENCOUNTER — ANESTHESIA (OUTPATIENT)
Dept: OBGYN | Facility: CLINIC | Age: 32
End: 2017-09-22
Payer: COMMERCIAL

## 2017-09-22 ENCOUNTER — NURSE TRIAGE (OUTPATIENT)
Dept: NURSING | Facility: CLINIC | Age: 32
End: 2017-09-22

## 2017-09-22 ENCOUNTER — ANESTHESIA EVENT (OUTPATIENT)
Dept: OBGYN | Facility: CLINIC | Age: 32
End: 2017-09-22
Payer: COMMERCIAL

## 2017-09-22 ENCOUNTER — HOSPITAL ENCOUNTER (INPATIENT)
Facility: CLINIC | Age: 32
LOS: 2 days | Discharge: HOME | End: 2017-09-24
Attending: OBSTETRICS & GYNECOLOGY | Admitting: OBSTETRICS & GYNECOLOGY
Payer: COMMERCIAL

## 2017-09-22 LAB
ABO + RH BLD: NORMAL
ABO + RH BLD: NORMAL
HGB BLD-MCNC: 13.2 G/DL (ref 11.7–15.7)
SPECIMEN EXP DATE BLD: NORMAL
T PALLIDUM IGG+IGM SER QL: NEGATIVE

## 2017-09-22 PROCEDURE — 25000125 ZZHC RX 250: Performed by: OBSTETRICS & GYNECOLOGY

## 2017-09-22 PROCEDURE — 25000128 H RX IP 250 OP 636

## 2017-09-22 PROCEDURE — 25000132 ZZH RX MED GY IP 250 OP 250 PS 637: Performed by: OBSTETRICS & GYNECOLOGY

## 2017-09-22 PROCEDURE — 00HU33Z INSERTION OF INFUSION DEVICE INTO SPINAL CANAL, PERCUTANEOUS APPROACH: ICD-10-PCS | Performed by: ANESTHESIOLOGY

## 2017-09-22 PROCEDURE — 12000029 ZZH R&B OB INTERMEDIATE

## 2017-09-22 PROCEDURE — 3E0R3CZ INTRODUCTION OF REGIONAL ANESTHETIC INTO SPINAL CANAL, PERCUTANEOUS APPROACH: ICD-10-PCS | Performed by: ANESTHESIOLOGY

## 2017-09-22 PROCEDURE — 37000011 ZZH ANESTHESIA WARD SERVICE

## 2017-09-22 PROCEDURE — 86780 TREPONEMA PALLIDUM: CPT | Performed by: OBSTETRICS & GYNECOLOGY

## 2017-09-22 PROCEDURE — 85018 HEMOGLOBIN: CPT | Performed by: OBSTETRICS & GYNECOLOGY

## 2017-09-22 PROCEDURE — 59400 OBSTETRICAL CARE: CPT | Performed by: OBSTETRICS & GYNECOLOGY

## 2017-09-22 PROCEDURE — 25000128 H RX IP 250 OP 636: Performed by: OBSTETRICS & GYNECOLOGY

## 2017-09-22 PROCEDURE — 25000125 ZZHC RX 250: Performed by: ANESTHESIOLOGY

## 2017-09-22 PROCEDURE — S0020 INJECTION, BUPIVICAINE HYDRO: HCPCS | Performed by: ANESTHESIOLOGY

## 2017-09-22 PROCEDURE — 72200001 ZZH LABOR CARE VAGINAL DELIVERY SINGLE

## 2017-09-22 PROCEDURE — 40000671 ZZH STATISTIC ANESTHESIA CASE

## 2017-09-22 PROCEDURE — 86901 BLOOD TYPING SEROLOGIC RH(D): CPT | Performed by: OBSTETRICS & GYNECOLOGY

## 2017-09-22 PROCEDURE — 86900 BLOOD TYPING SEROLOGIC ABO: CPT | Performed by: OBSTETRICS & GYNECOLOGY

## 2017-09-22 PROCEDURE — 0KQM0ZZ REPAIR PERINEUM MUSCLE, OPEN APPROACH: ICD-10-PCS | Performed by: OBSTETRICS & GYNECOLOGY

## 2017-09-22 PROCEDURE — 10907ZC DRAINAGE OF AMNIOTIC FLUID, THERAPEUTIC FROM PRODUCTS OF CONCEPTION, VIA NATURAL OR ARTIFICIAL OPENING: ICD-10-PCS | Performed by: OBSTETRICS & GYNECOLOGY

## 2017-09-22 RX ORDER — IBUPROFEN 400 MG/1
400-800 TABLET, FILM COATED ORAL EVERY 6 HOURS PRN
Status: DISCONTINUED | OUTPATIENT
Start: 2017-09-22 | End: 2017-09-24 | Stop reason: HOSPADM

## 2017-09-22 RX ORDER — EPHEDRINE SULFATE 50 MG/ML
INJECTION, SOLUTION INTRAMUSCULAR; INTRAVENOUS; SUBCUTANEOUS
Status: DISCONTINUED
Start: 2017-09-22 | End: 2017-09-22 | Stop reason: HOSPADM

## 2017-09-22 RX ORDER — IBUPROFEN 800 MG/1
800 TABLET, FILM COATED ORAL
Status: COMPLETED | OUTPATIENT
Start: 2017-09-22 | End: 2017-09-22

## 2017-09-22 RX ORDER — EPHEDRINE SULFATE 50 MG/ML
5 INJECTION, SOLUTION INTRAMUSCULAR; INTRAVENOUS; SUBCUTANEOUS
Status: DISCONTINUED | OUTPATIENT
Start: 2017-09-22 | End: 2017-09-22

## 2017-09-22 RX ORDER — LIDOCAINE 40 MG/G
CREAM TOPICAL
Status: DISCONTINUED | OUTPATIENT
Start: 2017-09-22 | End: 2017-09-22

## 2017-09-22 RX ORDER — BISACODYL 10 MG
10 SUPPOSITORY, RECTAL RECTAL DAILY PRN
Status: DISCONTINUED | OUTPATIENT
Start: 2017-09-24 | End: 2017-09-24 | Stop reason: HOSPADM

## 2017-09-22 RX ORDER — BUPIVACAINE HYDROCHLORIDE 2.5 MG/ML
INJECTION, SOLUTION EPIDURAL; INFILTRATION; INTRACAUDAL PRN
Status: DISCONTINUED | OUTPATIENT
Start: 2017-09-22 | End: 2017-09-22

## 2017-09-22 RX ORDER — OXYTOCIN 10 [USP'U]/ML
10 INJECTION, SOLUTION INTRAMUSCULAR; INTRAVENOUS
Status: DISCONTINUED | OUTPATIENT
Start: 2017-09-22 | End: 2017-09-24 | Stop reason: HOSPADM

## 2017-09-22 RX ORDER — OXYTOCIN/0.9 % SODIUM CHLORIDE 30/500 ML
100 PLASTIC BAG, INJECTION (ML) INTRAVENOUS CONTINUOUS
Status: DISCONTINUED | OUTPATIENT
Start: 2017-09-22 | End: 2017-09-24 | Stop reason: HOSPADM

## 2017-09-22 RX ORDER — OXYTOCIN/0.9 % SODIUM CHLORIDE 30/500 ML
1-24 PLASTIC BAG, INJECTION (ML) INTRAVENOUS CONTINUOUS
Status: DISCONTINUED | OUTPATIENT
Start: 2017-09-22 | End: 2017-09-22

## 2017-09-22 RX ORDER — SODIUM CHLORIDE, SODIUM LACTATE, POTASSIUM CHLORIDE, CALCIUM CHLORIDE 600; 310; 30; 20 MG/100ML; MG/100ML; MG/100ML; MG/100ML
INJECTION, SOLUTION INTRAVENOUS CONTINUOUS
Status: DISCONTINUED | OUTPATIENT
Start: 2017-09-22 | End: 2017-09-22

## 2017-09-22 RX ORDER — AMOXICILLIN 250 MG
1-2 CAPSULE ORAL 2 TIMES DAILY
Status: DISCONTINUED | OUTPATIENT
Start: 2017-09-22 | End: 2017-09-24 | Stop reason: HOSPADM

## 2017-09-22 RX ORDER — HYDROCORTISONE 2.5 %
CREAM (GRAM) TOPICAL 3 TIMES DAILY PRN
Status: DISCONTINUED | OUTPATIENT
Start: 2017-09-22 | End: 2017-09-24 | Stop reason: HOSPADM

## 2017-09-22 RX ORDER — OXYTOCIN/0.9 % SODIUM CHLORIDE 30/500 ML
100-340 PLASTIC BAG, INJECTION (ML) INTRAVENOUS CONTINUOUS PRN
Status: COMPLETED | OUTPATIENT
Start: 2017-09-22 | End: 2017-09-22

## 2017-09-22 RX ORDER — LANOLIN 100 %
OINTMENT (GRAM) TOPICAL
Status: DISCONTINUED | OUTPATIENT
Start: 2017-09-22 | End: 2017-09-24 | Stop reason: HOSPADM

## 2017-09-22 RX ORDER — ONDANSETRON 2 MG/ML
4 INJECTION INTRAMUSCULAR; INTRAVENOUS EVERY 6 HOURS PRN
Status: DISCONTINUED | OUTPATIENT
Start: 2017-09-22 | End: 2017-09-22

## 2017-09-22 RX ORDER — OXYTOCIN 10 [USP'U]/ML
10 INJECTION, SOLUTION INTRAMUSCULAR; INTRAVENOUS
Status: DISCONTINUED | OUTPATIENT
Start: 2017-09-22 | End: 2017-09-22

## 2017-09-22 RX ORDER — NALOXONE HYDROCHLORIDE 0.4 MG/ML
.1-.4 INJECTION, SOLUTION INTRAMUSCULAR; INTRAVENOUS; SUBCUTANEOUS
Status: DISCONTINUED | OUTPATIENT
Start: 2017-09-22 | End: 2017-09-22

## 2017-09-22 RX ORDER — FENTANYL CITRATE 50 UG/ML
50-100 INJECTION, SOLUTION INTRAMUSCULAR; INTRAVENOUS
Status: DISCONTINUED | OUTPATIENT
Start: 2017-09-22 | End: 2017-09-22

## 2017-09-22 RX ORDER — METHYLERGONOVINE MALEATE 0.2 MG/ML
200 INJECTION INTRAVENOUS
Status: DISCONTINUED | OUTPATIENT
Start: 2017-09-22 | End: 2017-09-22

## 2017-09-22 RX ORDER — CALCIUM CARBONATE 500 MG/1
1 TABLET, CHEWABLE ORAL DAILY
COMMUNITY
End: 2017-11-09

## 2017-09-22 RX ORDER — NALOXONE HYDROCHLORIDE 0.4 MG/ML
.1-.4 INJECTION, SOLUTION INTRAMUSCULAR; INTRAVENOUS; SUBCUTANEOUS
Status: DISCONTINUED | OUTPATIENT
Start: 2017-09-22 | End: 2017-09-24 | Stop reason: HOSPADM

## 2017-09-22 RX ORDER — PENICILLIN G POTASSIUM 5000000 [IU]/1
5 INJECTION, POWDER, FOR SOLUTION INTRAMUSCULAR; INTRAVENOUS ONCE
Status: COMPLETED | OUTPATIENT
Start: 2017-09-22 | End: 2017-09-22

## 2017-09-22 RX ORDER — MISOPROSTOL 200 UG/1
400 TABLET ORAL
Status: DISCONTINUED | OUTPATIENT
Start: 2017-09-22 | End: 2017-09-24 | Stop reason: HOSPADM

## 2017-09-22 RX ORDER — ACETAMINOPHEN 325 MG/1
650 TABLET ORAL EVERY 4 HOURS PRN
Status: DISCONTINUED | OUTPATIENT
Start: 2017-09-22 | End: 2017-09-24 | Stop reason: HOSPADM

## 2017-09-22 RX ORDER — PRENATAL VIT/IRON FUM/FOLIC AC 27MG-0.8MG
1 TABLET ORAL DAILY
Status: DISCONTINUED | OUTPATIENT
Start: 2017-09-23 | End: 2017-09-24 | Stop reason: HOSPADM

## 2017-09-22 RX ORDER — CARBOPROST TROMETHAMINE 250 UG/ML
250 INJECTION, SOLUTION INTRAMUSCULAR
Status: DISCONTINUED | OUTPATIENT
Start: 2017-09-22 | End: 2017-09-22

## 2017-09-22 RX ORDER — OXYTOCIN/0.9 % SODIUM CHLORIDE 30/500 ML
340 PLASTIC BAG, INJECTION (ML) INTRAVENOUS CONTINUOUS PRN
Status: DISCONTINUED | OUTPATIENT
Start: 2017-09-22 | End: 2017-09-24 | Stop reason: HOSPADM

## 2017-09-22 RX ORDER — ACETAMINOPHEN 325 MG/1
650 TABLET ORAL EVERY 4 HOURS PRN
Status: DISCONTINUED | OUTPATIENT
Start: 2017-09-22 | End: 2017-09-22

## 2017-09-22 RX ORDER — OXYCODONE AND ACETAMINOPHEN 5; 325 MG/1; MG/1
1 TABLET ORAL
Status: DISCONTINUED | OUTPATIENT
Start: 2017-09-22 | End: 2017-09-22

## 2017-09-22 RX ADMIN — DEXTROSE AND SODIUM CHLORIDE: 5; 900 INJECTION, SOLUTION INTRAVENOUS at 08:37

## 2017-09-22 RX ADMIN — Medication 15 ML/HR: at 05:29

## 2017-09-22 RX ADMIN — ONDANSETRON 4 MG: 2 INJECTION INTRAMUSCULAR; INTRAVENOUS at 04:53

## 2017-09-22 RX ADMIN — IBUPROFEN 800 MG: 800 TABLET, FILM COATED ORAL at 16:17

## 2017-09-22 RX ADMIN — SODIUM CHLORIDE 2.5 MILLION UNITS: 9 INJECTION, SOLUTION INTRAVENOUS at 13:38

## 2017-09-22 RX ADMIN — PENICILLIN G POTASSIUM 5 MILLION UNITS: 5000000 POWDER, FOR SOLUTION INTRAMUSCULAR; INTRAPLEURAL; INTRATHECAL; INTRAVENOUS at 05:30

## 2017-09-22 RX ADMIN — OXYTOCIN-SODIUM CHLORIDE 0.9% IV SOLN 30 UNIT/500ML 2 MILLI-UNITS/MIN: 30-0.9/5 SOLUTION at 11:26

## 2017-09-22 RX ADMIN — Medication 10 ML: at 11:28

## 2017-09-22 RX ADMIN — OXYTOCIN-SODIUM CHLORIDE 0.9% IV SOLN 30 UNIT/500ML 340 ML/HR: 30-0.9/5 SOLUTION at 15:12

## 2017-09-22 RX ADMIN — SODIUM CHLORIDE 2.5 MILLION UNITS: 9 INJECTION, SOLUTION INTRAVENOUS at 09:30

## 2017-09-22 RX ADMIN — ACETAMINOPHEN 650 MG: 325 TABLET, FILM COATED ORAL at 11:01

## 2017-09-22 RX ADMIN — BUPIVACAINE HYDROCHLORIDE 10 ML: 2.5 INJECTION, SOLUTION EPIDURAL; INFILTRATION; INTRACAUDAL at 05:28

## 2017-09-22 RX ADMIN — SODIUM CHLORIDE, POTASSIUM CHLORIDE, SODIUM LACTATE AND CALCIUM CHLORIDE: 600; 310; 30; 20 INJECTION, SOLUTION INTRAVENOUS at 04:49

## 2017-09-22 RX ADMIN — SENNOSIDES AND DOCUSATE SODIUM 1 TABLET: 8.6; 5 TABLET ORAL at 21:01

## 2017-09-22 RX ADMIN — FENTANYL CITRATE 100 MCG: 50 INJECTION INTRAMUSCULAR; INTRAVENOUS at 04:49

## 2017-09-22 RX ADMIN — ACETAMINOPHEN 650 MG: 325 TABLET, FILM COATED ORAL at 20:59

## 2017-09-22 NOTE — PROVIDER NOTIFICATION
09/22/17 0730   Provider Notification   Provider Name/Title Dr. Engle   Method of Notification In Department   Request Evaluate - Remote   Notification Reason Status Update;Membrane Status;Labor Status;SVE   MD updated on pt's labor status, epidural in place, SVE from previous exam by RN.  Plan to start D5NS 250 ml/hr and AROM after MD's scheduled section this AM. Orders for john catheter insertion received.

## 2017-09-22 NOTE — PROVIDER NOTIFICATION
09/22/17 1102   Provider Notification   Provider Name/Title    Method of Notification Phone   Request Evaluate - Remote   Notification Reason SVE;Status Update;Labor Status   MD updated on pt status, SVE change, and low grade temp.  MD ordered Pitocin per protocol, titrate IV maintenance PRN.

## 2017-09-22 NOTE — ADDENDUM NOTE
Addendum  created 09/22/17 1159 by Juan Barr MD    Anesthesia Event edited, Anesthesia Intra Meds edited, Procedure Event Log accessed

## 2017-09-22 NOTE — IP AVS SNAPSHOT
MRN:[id number]                      After Visit Summary   9/22/2017    Kendal Miranda    MRN: [id number]           Thank you!     Thank you for choosing [facility] for your care. Our goal is always to provide you with excellent care. Hearing back from our patients is one way we can continue to improve our services. Please take a few minutes to complete the written survey that you may receive in the mail after you visit. If you would like to speak to someone directly about your visit please contact Patient Relations at [phone]. Thank you!          Patient Information     Date Of Birth          1985        Designated Caregiver       Most Recent Value    Caregiver    Will someone help with your care after discharge? no      About your hospital stay     You were admitted on:  September 22, 2017 You last received care in the:  [facility]    You were discharged on:  September 24, 2017       Who to Call     For medical emergencies, please call 911.  For non-urgent questions about your medical care, please call your primary care provider or clinic, [phone]          Attending Provider     Provider Specialty    Naa Ashley MD OB/Gyn    [facility]       Primary Care Provider Office Phone # Fax #    Susan Rachele [facility]CONST[facility] [phone]      Further instructions from your care team       Postpartum pain control:    You will likely experience cramping for 1-2 weeks.   You can take up to ibuprofen 800mg every 8 hours as needed for pain.  You can additionally take 1-2 tabs of tylenol (325-650mg regular strength 500-1000mg extra strength), every 6 hours for additional pain control as needed.  It is best to alternate your medications if you are having continued pain.    If you have vaginal pain you can take sitz baths 1-2x/day. Fill the tub with 2-3 inches of warm water and soak the perineal and vaginal area for 10 minutes.  Ice or warm packs can also be applied for comfort.    Please call the office for:  Temperature above 100.4  Severe headache, especially with changes in your vision  Heavy bleeding (more than one pad an hour for more than 2 hours in a row)  Any other new, concerning symptoms.    Constipation  You may use the following products to ease constipation:    1. Stool softeners such as metamucil or benefiber  2. Senna 1-2 times daily  3. Fiber supplements  4. Miralax (over the counter).  5. Dulcolax    Please be sure to keep adequately hydrated; 6-8 8oz glasses daily, more if needed to compensate for exercise, sweating, etc.      More specific dosing can be found below:    - metamucil 28g daily PO with 8oz of water  - senna-docusate 8.6-50 MG per tablet PO 1 tablet, Oral, 2 TIMES DAILY, Start with 1 tablet PO BID, reduce to 1 tablet daily when having daily BMs. Stop for loose stools.  - docusate sodium (COLACE) capsule 100 mg, Oral, 2 TIMES DAILY, To prevent constipation. Hold for loose stools.  - bisacodyl (DULCOLAX) suppository 10 mg, Rectal, DAILY PRN, constipation, Hold for loose stools.       Please contact the clinic with any questions.  Please schedule a follow up appointment with your primary OB/Gyn provider in 6 weeks.   You can respond with nGame or call Shanta at [phone].    Postpartum Vaginal Delivery Instructions    Follow up in clinic at 6 weeks post partum    Home care   [phone]    Lactation  [phone]      Activity       Ask family and friends for help when you need it.    Do not place anything in your vagina for 6 weeks.    You are not restricted on other activities, but take it easy for a few weeks to allow your body to recover from delivery.  You are able to do any activities you feel up to that point.    No driving until you have stopped taking your pain medications (usually two weeks after delivery).     Call your health care provider if you have any of these symptoms:       Increased  pain, swelling, redness, or fluid around your stiches from an episiotomy or perineal tear.    A fever above 100.4 F (38 C) with or without chills when placing a thermometer under your tongue.    You soak a sanitary pad with blood within 1 hour, or you see blood clots larger than a golf ball.    Bleeding that lasts more than 6 weeks.    Vaginal discharge that smells bad.    Severe pain, cramping or tenderness in your lower belly area.    A need to urinate more frequently (use the toilet more often), more urgently (use the toilet very quickly), or it burns when you urinate.    Nausea and vomiting.    Redness, swelling or pain around a vein in your leg.    Problems breastfeeding or a red or painful area on your breast.    Chest pain and cough or are gasping for air.    Problems coping with sadness, anxiety, or depression.  If you have any concerns about hurting yourself or the baby, call your provider immediately.     You have questions or concerns after you return home.     Keep your hands clean:  Always wash your hands before touching your perineal area and stitches.  This helps reduce your risk of infection.  If your hands aren't dirty, you may use an alcohol hand-rub to clean your hands. Keep your nails clean and short.        Pending Results     No orders found from 9/20/2017 to 9/23/2017.            Statement of Approval     Ordered          09/24/17 0919  I have reviewed and agree with all the recommendations and orders detailed in this document.  EFFECTIVE NOW     Approved and electronically signed by:  Priscila Banks DO             Admission Information     Date & Time Provider Department Dept. Phone    9/22/2017 Verónica Engle MD [facility] [phone]      Your Vitals Were     Blood Pressure Temperature Respirations Last Period          130/78 98.1  F (36.7  C) (Oral) 16 12/15/2016 (Exact Date)        MyChart Information     [facility] gives you secure access to your electronic  health record. If you see a primary care provider, you can also send messages to your care team and make appointments. If you have questions, please call your primary care clinic.  If you do not have a primary care provider, please call [phone] and they will assist you.        Care EveryWhere ID     This is your Care EveryWhere ID. This could be used by other organizations to access your [facility] medical records  [id number]        Equal Access to Services     YAHAIRA RAMESH : Sharad Sue, waamparo long, qaanselmota kaalmada brenda, bg mauriciorashmireg singh . So [facility] [phone].    ATENCIÓN: Si cullen farias, tiene a sal disposición servicios gratuitos de asistencia lingüística. Diane al [phone].    We comply with applicable federal civil rights laws and [place] laws. We do not discriminate on the basis of race, color, national origin, age, disability sex, sexual orientation or gender identity.               Review of your medicines      START taking        Dose / Directions    ibuprofen 800 MG tablet   Commonly known as:  ADVIL/MOTRIN        Dose:  800 mg   Take 1 tablet (800 mg) by mouth every 8 hours as needed for moderate pain   Quantity:  30 tablet   Refills:  1         CONTINUE these medicines which have NOT CHANGED        Dose / Directions    breast pump Misc   Used for:  Normal first pregnancy in third trimester        Dose:  1 each   1 each as needed   Quantity:  1 each   Refills:  0       calcium carbonate 500 MG chewable tablet   Commonly known as:  TUMS        Dose:  1 chew tab   Take 1 chew tab by mouth daily   Refills:  0       loratadine 10 MG tablet   Commonly known as:  CLARITIN   Used for:  Upper respiratory tract infection, unspecified type        Dose:  10 mg   Take 1 tablet (10 mg) by mouth daily   Quantity:  30 tablet   Refills:  1       PRENATAL COMPLETE 14-0.4 MG Tabs        Refills:  0         STOP taking     TYLENOL PO                Where to  get your medicines      Some of these will need a paper prescription and others can be bought over the counter. Ask your nurse if you have questions.     Bring a paper prescription for each of these medications     ibuprofen 800 MG tablet                Protect others around you: Learn how to safely use, store and throw away your medicines at www.disposemymeds.org.             Medication List: This is a list of all your medications and when to take them. Check marks below indicate your daily home schedule. Keep this list as a reference.      Medications           Morning Afternoon Evening Bedtime As Needed    breast pump Misc   1 each as needed                                calcium carbonate 500 MG chewable tablet   Commonly known as:  TUMS   Take 1 chew tab by mouth daily                                ibuprofen 800 MG tablet   Commonly known as:  ADVIL/MOTRIN   Take 1 tablet (800 mg) by mouth every 8 hours as needed for moderate pain   Last time this was given:  800 mg on 9/24/2017  5:34 AM                                loratadine 10 MG tablet   Commonly known as:  CLARITIN   Take 1 tablet (10 mg) by mouth daily                                PRENATAL COMPLETE 14-0.4 MG Tabs

## 2017-09-22 NOTE — PROVIDER NOTIFICATION
09/22/17 1450   Provider Notification   Provider Name/Title Dr. Poe   Method of Notification Electronic Page   Request Attend Delivery   Dr. Engle paged to come for delivery,

## 2017-09-22 NOTE — IP AVS SNAPSHOT
[facility]    [address]    [facility] [address]    Phone:  [phone]    Fax:  [phone]                                       After Visit Summary   9/22/2017    Kendal Miranda    MRN: [id number]           After Visit Summary Signature Page     I have received my discharge instructions, and my questions have been answered. I have discussed any challenges I see with this plan with the nurse or doctor.    ..........................................................................................................................................  Patient/Patient Representative Signature      ..........................................................................................................................................  Patient Representative Print Name and Relationship to Patient    ..................................................               ................................................  Date                                            Time    ..........................................................................................................................................  Reviewed by Signature/Title    ...................................................              ..............................................  Date                                                            Time

## 2017-09-22 NOTE — PROVIDER NOTIFICATION
09/22/17 0518   Provider Notification   Provider Name/Title Dr. Martin   Method of Notification At Bedside      Patient informed MRI findings via phone and [facility].     Will repeat MRI and CMP, AFP, HBV DNA quant in 2 months.     Pl schedule

## 2017-09-22 NOTE — ANESTHESIA PROCEDURE NOTES
Peripheral nerve/Neuraxial procedure note : epidural catheter  Pre-Procedure  Performed by BUBBA PORTER  Referred by EDEN  Location: OB    Procedure Times:9/22/2017 5:12 AM and 9/22/2017 5:29 AM  Pre-Anesthestic Checklist: patient identified, IV checked, risks and benefits discussed, informed consent, monitors and equipment checked, pre-op evaluation and at physician/surgeon's request    Timeout  Correct Patient: Yes   Correct Procedure: Yes   Correct Site: Yes   Correct Laterality: N/A   Correct Position: Yes   Site Marked: N/A   .   Procedure Documentation    .    Procedure:    Epidural catheter.  Insertion Site:L3-4  (midline approach) Injection technique: LORT saline   Local skin infiltrated with mL of 1% lidocaine.  TIFFANIE at 5 cm     Patient Prep;mask, sterile gloves, povidone-iodine 7.5% surgical scrub, patient draped.  .  Needle: Touhy needle Needle Gauge: 17.    Needle Length (Inches) 3.5  # of attempts: 1 and # of redirects:  .   Catheter: 19 G . .  Catheter threaded easily  6 cm epidural space.  11 cm at skin.   .    Assessment/Narrative  Paresthesias: No.  .  .  Aspiration negative for heme or CSF  . Test dose of 3 mL lidocaine 1.5% w/ 1:200,000 epinephrine at 05:25.  Test dose negative for signs of intravascular, subdural or intrathecal injection.

## 2017-09-22 NOTE — H&P
No significant change in general health status based on examination of the patient, review of Nursing Admission Database and prenatal record.    EFW: 7lb 8oz - 8 lbs

## 2017-09-22 NOTE — PROVIDER NOTIFICATION
09/22/17 0422   Provider Notification   Provider Name/Title Dr. Ashley   Method of Notification Phone   Request Evaluate - Remote   Notification Reason Status Update   updated MD on pt arrival, SVE,  CX, Fetal tracing, GBS positive. pt desires epidural. Intrapartum orders received with pcn. Pt ok to have epidural when she desires.

## 2017-09-22 NOTE — PROVIDER NOTIFICATION
09/22/17 1455   Provider Notification   Provider Name/Title Dr. Engle   Method of Notification At Bedside   Request Attend Delivery   Dr. Engle here for delivery

## 2017-09-22 NOTE — PLAN OF CARE
Data: Kendal Miranda transferred to [facility] via wheelchair at 1805. Baby transferred via mom's arms.  Action: Receiving unit notified of transfer: Yes. Patient and family notified of room change. Report given to GORDY Lam  at 1815. Belongings sent to receiving unit. Accompanied by Registered Nurse. Oriented patient to surroundings. Call light within reach. ID bands double-checked with receiving RN.  Response: Patient tolerated transfer and is stable.

## 2017-09-22 NOTE — PROVIDER NOTIFICATION
09/22/17 1251   Provider Notification   Provider Name/Title Dr. Engle   Method of Notification Phone   Request Evaluate - Remote   Notification Reason Fetal Baseline Change;SVE;Status Update   MD updated on SVE, laboring down.  Fetal HR baseline change, 150.  Will call for delivery.

## 2017-09-22 NOTE — PROGRESS NOTES
G 1 P 0, GA 40.1. Here for rule out labor External monitors applied and explained. Health history obtained. Denies HA, visual changes, epigastric pain. Reflexes +1. Pt denies LOF. Pt reports having membranes stripped on 9/21, she felt crampy throughout the day but was awoken from sleep from cx pain at 0100. cx have been 4-4.5 min apart, palpating moderate. Pt rates pain 8/10, breathing well through cx and able to relax in between. Pt desires epidural once available.  Fetal tracing moderate with accels. SVE 2.5/95/-1, old bloody show noted on glove. Pt off monitor to ambulate in halls. Will recheck cervix and update MD.

## 2017-09-22 NOTE — PROVIDER NOTIFICATION
09/22/17 1451   Provider Notification   Provider Name/Title DR. Engle   Method of Notification Phone   MD to come for delivery

## 2017-09-22 NOTE — PROVIDER NOTIFICATION
09/22/17 1111   Provider Notification   Provider Name/Title MDA   Method of Notification Phone   Request Evaluate in Person   Notification Reason Pain   MDA paged for patient's pain status on L side of abdomen, will come up to floor to evaluate patient.

## 2017-09-22 NOTE — TELEPHONE ENCOUNTER
"  Reason for Disposition    MODERATE-SEVERE abdominal pain     crampy contractions every 10 minutes, caller requests OB be paged    Additional Information    Negative: Passed out (i.e., lost consciousness, collapsed and was not responding)    Negative: Shock suspected (e.g., cold/pale/clammy skin, too weak to stand, low BP, rapid pulse)    Negative: Difficult to awaken or acting confused  (e.g., disoriented, slurred speech)    Negative: [1] SEVERE abdominal pain (e.g., excruciating) AND [2] constant AND [3] present > 1 hour    Negative: Severe bleeding (e.g., continuous red blood from vagina, or large blood clots)    Negative: Umbilical cord hanging out of the vagina (shiny, white, curled appearance, \"like telephone cord\")    Negative: Uncontrollable urge to push (i.e., feels like baby is coming out now)    Negative: Can see baby    Negative: Sounds like a life-threatening emergency to the triager    Negative: Pregnant < 37 weeks (i.e., )    Negative: [1] Uncertain delivery date AND [2] possibly pregnant < 37 weeks (i.e., )    Negative: [1] First baby (primipara) AND [2] contractions < 6 minutes apart  AND [3] present 2 hours    Negative: [1] History of prior delivery (multipara) AND [2] contractions < 10 minutes apart AND [3] present 1 hour    Negative: [1] History of rapid prior delivery AND [2] contractions < 10 minutes apart    Negative: [1] Leakage of fluid from vagina AND [2] green or brown in color    Negative: [1] Leakage of fluid from vagina AND [2] leakage started > 4 hours ago    Negative: Vaginal bleeding or spotting    Negative: Baby moving less today (e.g., kick count < 5 in 1 hour or < 10 in 2 hours)    Negative: New hand or face swelling    Answer Assessment - Initial Assessment Questions  1. ONSET: \"When did the symptoms begin?\"         1-2 hours ago  2. CONTRACTIONS: \"Describe the contractions that you are having.\" (e.g., duration, frequency, regularity, severity)      Every 10 " "minutes, crampy contractions  3. RANDAL: \"What date are you expecting to deliver?\"      9/21/17  4. PARITY: \"Have you had a baby before?\" If yes, \"How long did the labor last?\"      first  5. FETAL MOVEMENT: \"Has the baby's movement decreased or changed significantly from normal?\"      unchanged  6. OTHER SYMPTOMS: \"Do you have any other symptoms?\" (e.g., leaking fluid from vagina, vaginal bleeding, fever, hand/facial swelling)      no    Protocols used: PREGNANCY - LABOR-ADULT-AH    "

## 2017-09-22 NOTE — L&D DELIVERY NOTE
Delivery Summary    Kendal Miranda MRN# [id number]   Age: 32 year old YOB: 1985     ASSESSMENT & PLAN: 32 year old female   Obstetric History       T1      L1     SAB0   TAB0   Ectopic0   Multiple0   Live Births1     at 40w1d admitted in active labor.    Progessed with pitocin augmentation; received epidural for anesthesia.    Delivered a viable female infant over intact perineum.   bilateral labial, midline vaginal laceration repaired in usual fashion.            Labor Event Times    Labor onset date:  17 Onset time:   4:25 AM   Dilation complete date:  17 Complete time:  12:24 PM   Start pushing date/time:  2017 1343            Labor Length    1st Stage (hrs):  7 (min):  59   2nd Stage (hrs):  2 (min):  40   3rd Stage (hrs):  0 (min):  21      Labor Events     labor?:  No    steroids:  None   Labor Type:  Augmentation   Predominate monitoring during 1st stage:  continuous electronic fetal monitoring      Antibiotics received during labor?:  Yes   Reason for Antibiotics:  GBS   Antibiotics received for GBS:  Penicillin   Antibiotics Given (GBS):  Greater than 4 hours prior to delivery      Rupture date/time: 17 0851   Rupture type:  Artificial Rupture of Membranes   Fluid color:  Other (comment)   Fluid odor:  Other (Comment)      1:1 continuous labor support provided by?:  RN          Delivery/Placenta Date and Time    Delivery Date:  17 Delivery Time:   3:04 PM   Placenta Date/Time:  2017  3:25 PM   Oxytocin given at the time of delivery:  after delivery of baby      Vaginal Counts    Initial count performed by 2 team members:   Two Team Members   Stacy Engle          Needles Suture Elloree Sponges Instruments   Initial counts 2  5    Added to count  2     Final counts          Placed during labor Accounted for at the end of labor   NA    NA    NA          Final count correct?:  Yes         Apgars    Living status:   Living    1 Minute 5 Minute 10 Minute 15 Minute 20 Minute   Skin color: 0  1       Heart rate: 2  2       Reflex irritability: 1  2       Muscle tone: 2  2       Respiratory effort: 1  2       Total: 6  9          Apgars assigned by:  ZOILA BALLARD RN      Cord    Vessels:  3 Vessels Complications:  None   Cord Blood Disposition:  Lab Gases Sent?:  No         [place] Resuscitation    Methods:  None      Output in Delivery Room:  Stool      Skin to Skin and Feeding Plan    Skin to skin initiation date/time: 17 1505   Skin to skin with:  Mother   Skin to skin end date/time:        Labor Events and Shoulder Dystocia    Fetal Tracing Prior to Delivery:  Category 2   Shoulder dystocia present?:  Neg            Delivery (Maternal) (Provider to Complete) (869765)    Episiotomy:  None   Perineal lacerations:  2nd Repaired?:  Yes   Labial laceration:  bilateral Repaired?:  Yes   Vaginal laceration?:  Yes Repaired?:  Yes         Mother's Information  Mother: Kendal Miranda #[id number]    Start of Mother's Information     IO Blood Loss  17 0425 - 17 1543    Mom's I/O Activity            End of Mother's Information  Mother: Kendal Miranda #[id number]            Delivery - Provider to Complete (046611)    Delivering clinician:  JOYA ENGLE   Delivery Type (Choose the 1 that will go to the Birth History):  Vaginal, Spontaneous Delivery                           Placenta    Delayed Cord Clamping:  Done   Date/Time:  2017  3:25 PM   Removal:  Spontaneous   Disposition:  Hospital disposal      Anesthesia    Method:  Epidural         Presentation and Position    Presentation:  Vertex   Position:  Right Occiput Anterior                    Joya Engle MD

## 2017-09-22 NOTE — PROVIDER NOTIFICATION
09/22/17 0508   Provider Notification   Provider Name/Title MDA   Method of Notification Electronic Page   MDA paged for epidural

## 2017-09-22 NOTE — PROVIDER NOTIFICATION
09/22/17 0649   Provider Notification   Provider Name/Title Dr. Ashley   Method of Notification Phone   updated MD regarding epidural placement, pt comfortable, SVE, fetal tracing, cx. 1st dose of pcn infused.

## 2017-09-23 PROCEDURE — 25000132 ZZH RX MED GY IP 250 OP 250 PS 637: Performed by: OBSTETRICS & GYNECOLOGY

## 2017-09-23 PROCEDURE — 12000029 ZZH R&B OB INTERMEDIATE

## 2017-09-23 RX ADMIN — PRENATAL VIT W/ FE FUMARATE-FA TAB 27-0.8 MG 1 TABLET: 27-0.8 TAB at 09:52

## 2017-09-23 RX ADMIN — IBUPROFEN 800 MG: 400 TABLET ORAL at 09:52

## 2017-09-23 RX ADMIN — IBUPROFEN 800 MG: 400 TABLET ORAL at 15:35

## 2017-09-23 RX ADMIN — SENNOSIDES AND DOCUSATE SODIUM 1 TABLET: 8.6; 5 TABLET ORAL at 21:28

## 2017-09-23 RX ADMIN — IBUPROFEN 800 MG: 400 TABLET ORAL at 00:17

## 2017-09-23 RX ADMIN — IBUPROFEN 800 MG: 400 TABLET ORAL at 21:28

## 2017-09-23 RX ADMIN — SENNOSIDES AND DOCUSATE SODIUM 1 TABLET: 8.6; 5 TABLET ORAL at 09:52

## 2017-09-23 NOTE — PLAN OF CARE
Problem: Patient Care Overview  Goal: Plan of Care/Patient Progress Review  Outcome: Improving  Doing well with self and infant cares, breast feeding with minimal staff assist. Ibuprofen and tylenol for pain with stated relief, voiding without difficulty. FOB present and supportive, participating in baby cares.

## 2017-09-23 NOTE — ANESTHESIA POSTPROCEDURE EVALUATION
Patient: Kendal Miranda    * No procedures listed *    Diagnosis:* No pre-op diagnosis entered *  Diagnosis Additional Information: Labor pain    Anesthesia Type:  Epidural    Note:  Anesthesia Post Evaluation    Patient location during evaluation: Floor  Patient participation: Able to fully participate in evaluation  Level of consciousness: awake  Pain management: adequate  Airway patency: patent  Cardiovascular status: acceptable  Respiratory status: acceptable  Hydration status: euvolemic  PONV: controlled     Anesthetic complications: None    Comments: S/P epidural for labor. I or my partner remained immediately available, monitored the patient, and supervised nursing staff at key events and necessary intervals.    The patient is doing well. VSS Temp normal. Satisfactory cardiovascular and repiratory function. Neuro at baseline. Denies positional headache. Minimal side effects easily managed w/ PRN meds. No apparent anesthetic complications. No follow-up required.    [email or web address]        Last vitals:  Vitals:    09/22/17 1715 09/22/17 1730 09/23/17 0017   BP: 130/74 125/80 117/78   Resp:   18   Temp:   98.6  F (37  C)         Electronically Signed By: Damian Parrish MD  September 23, 2017  8:54 AM

## 2017-09-23 NOTE — PLAN OF CARE
Problem: Patient Care Overview  Goal: Plan of Care/Patient Progress Review  Outcome: Improving  Pt meeting expected outcomes. Using ice, tucks and Ibuprofen for faraz pain with adequate relief. Up ad jae and walking in room. Showered today. Breastfeeding very well with shield. Working on discharge paperwork.  present and supportive.

## 2017-09-23 NOTE — PLAN OF CARE
Problem: Postpartum (Vaginal Delivery) (Adult,Obstetrics,Pediatric)  Goal: Signs and Symptoms of Listed Potential Problems Will be Absent, Minimized or Managed (Postpartum)  Signs and symptoms of listed potential problems will be absent, minimized or managed by discharge/transition of care (reference Postpartum (Vaginal Delivery) (Adult,Obstetrics,Pediatric) CPG).   Outcome: Improving  Pt able to get some rest w/  rooming-in for feedings.  FOB went home for night; performing self and baby cares w/ minimal staff assist.  States ordered pain medications working well to make her more comfortable.  Ice and tucks pads to perineal/labial laceration repair for comfort, as well.  Voiding w/o difficulty.  Plan to continue to monitor.

## 2017-09-23 NOTE — PROGRESS NOTES
[facility] Obstetrics Post-Partum Progress Note          Assessment and Plan:    Assessment:   Post-partum day #1  Normal spontaneous vaginal delivery  L&D complications: None      Doing well.      Plan:   Anticipate discharge tomorrow           Interval History:   Doing well.  Pain is well-controlled.  No fevers.  No history of foul-smelling vaginal discharge.  Good appetite.  Denies chest pain, shortness of breath, nausea or vomiting.  Vaginal bleeding is similar to a heavy menstrual flow.  Ambulatory.  Breastfeeding well.            Significant Problems:    None          Review of Systems:    The patient denies any chest pain, shortness of breath, excessive pain, fever, chills, purulent drainage from the wound, nausea or vomiting.          Medications:   All medications related to the patient's surgery have been reviewed          Physical Exam:     All vitals stable  Patient Vitals for the past 24 hrs:   BP Temp Temp src Heart Rate Resp   09/23/17 0017 117/78 98.6  F (37  C) Oral 80 18   09/22/17 1730 125/80 - - - -   09/22/17 1715 130/74 - - - -   09/22/17 1700 130/71 - - - -   09/22/17 1645 129/64 - - - -   09/22/17 1630 125/70 - - - -   09/22/17 1615 122/69 - - - -   09/22/17 1600 127/75 98.7  F (37.1  C) Axillary - 16   09/22/17 1542 123/74 - - - -   09/22/17 1527 126/76 - - - 16 09/22/17 1522 113/89 - - - -   09/22/17 1520 - 100.1  F (37.8  C) Axillary - -   09/22/17 1408 - 100  F (37.8  C) Axillary - -   09/22/17 1243 130/78 - - - 16 09/22/17 1215 - 99.7  F (37.6  C) Axillary - -   09/22/17 1149 126/74 - - - 16 09/22/17 1145 122/71 - - - 16 09/22/17 1139 125/73 - - - 16 09/22/17 1134 123/71 - - - 16 09/22/17 1131 122/72 - - - 16   09/22/17 1128 119/71 - - - 16   09/22/17 1127 119/71 - - - 16 09/22/17 1059 - 99.5  F (37.5  C) Axillary - 16 09/22/17 1053 120/68 - - - 16 09/22/17 1019 111/56 98.3  F (36.8  C) Oral - 16     Uterine fundus is firm, non-tender and at the level of the  umbilicus          Data:     All laboratory data related to this surgery reviewed  Hemoglobin   Date Value Ref Range Status   09/22/2017 13.2 11.7 - 15.7 g/dL Final   06/16/2017 12.5 11.7 - 15.7 g/dL Final   02/24/2017 13.1 11.7 - 15.7 g/dL Final   09/23/2016 12.9 11.7 - 15.7 g/dL Final   05/03/2016 13.2 11.7 - 15.7 g/dL Final     No imaging studies have been ordered  Sergey Madrigal MD, MD

## 2017-09-24 VITALS — RESPIRATION RATE: 16 BRPM | DIASTOLIC BLOOD PRESSURE: 71 MMHG | SYSTOLIC BLOOD PRESSURE: 134 MMHG | TEMPERATURE: 98.7 F

## 2017-09-24 PROCEDURE — 40000086 ZZH STATISTIC IP LACTATION SERVICES 46-60 MIN

## 2017-09-24 PROCEDURE — 25000132 ZZH RX MED GY IP 250 OP 250 PS 637: Performed by: OBSTETRICS & GYNECOLOGY

## 2017-09-24 RX ORDER — IBUPROFEN 800 MG/1
800 TABLET, FILM COATED ORAL EVERY 8 HOURS PRN
Qty: 30 TABLET | Refills: 1 | Status: SHIPPED | OUTPATIENT
Start: 2017-09-24 | End: 2017-11-09

## 2017-09-24 RX ADMIN — SENNOSIDES AND DOCUSATE SODIUM 1 TABLET: 8.6; 5 TABLET ORAL at 11:16

## 2017-09-24 RX ADMIN — IBUPROFEN 800 MG: 400 TABLET ORAL at 05:34

## 2017-09-24 RX ADMIN — IBUPROFEN 800 MG: 400 TABLET ORAL at 11:16

## 2017-09-24 RX ADMIN — PRENATAL VIT W/ FE FUMARATE-FA TAB 27-0.8 MG 1 TABLET: 27-0.8 TAB at 11:16

## 2017-09-24 NOTE — PROGRESS NOTES
[facility] Obstetrics Post-Partum Progress Note          Assessment and Plan:    Assessment:   Post-partum day #2  Normal spontaneous vaginal delivery  L&D complications: None      Doing well.      Plan:   Discharge today.           Interval History:   Doing well.  Pain is well-controlled.  No fever or chills.  No foul-smelling vaginal discharge. Lochia mild. Eating well with no nausea or vomiting.  Denies chest pain, shortness of breath, nausea or leg pain.  Ambulatory.  Breastfeeding well.            Significant Problems:    None          Review of Systems:    The patient denies any chest pain, shortness of breath, excessive pain, fever, chills, purulent drainage from the wound, nausea or vomiting.          Medications:   All medications related to the patient's surgery have been reviewed          Physical Exam:     All vitals stable  Patient Vitals for the past 24 hrs:   BP Temp Temp src Heart Rate Resp   09/23/17 2114 130/78 98.1  F (36.7  C) Oral 86 16   09/23/17 1535 124/68 98.7  F (37.1  C) Oral 81 16   09/23/17 1000 126/71 98.5  F (36.9  C) Oral 88 18     Uterine fundus is firm, non-tender and below the level of the umbilicus          Data:     All laboratory data related to this surgery reviewed  Hemoglobin   Date Value Ref Range Status   09/22/2017 13.2 11.7 - 15.7 g/dL Final   06/16/2017 12.5 11.7 - 15.7 g/dL Final   02/24/2017 13.1 11.7 - 15.7 g/dL Final   09/23/2016 12.9 11.7 - 15.7 g/dL Final   05/03/2016 13.2 11.7 - 15.7 g/dL Final     No imaging studies have been ordered  Priscila Banks DO

## 2017-09-24 NOTE — PLAN OF CARE
Pt discharging today in stable condition.  Pt knows when to follow up in clinic and all questions answered at this time.  Pt has medications for discharge and has a breast pump at home.    Pt has a lactation referral in hand and will follow up with [facility] and has number to make appointment.

## 2017-09-24 NOTE — PLAN OF CARE
Patient meeting expected outcomes. Pain well managed with Ibuprofen prn per patient request, ice and tucks. Reviewed discharge planning. Depression score and screening. Parents will fill out birth certificate and watch videos in AM. VSS, breastfeeding well with nipple shield, however nipples are becoming sore after cluster feedings. Lactation requested to see patient before discharge.

## 2017-09-24 NOTE — LACTATION NOTE
Lactation visit. Patient is having very sore nipples, even with shield and latch assist use. Cracking and redness noted. Patient using Motherlove cream, gave Hydrogels and instructed on use. Reviewed proper shield use. Her milk is in and breasts are firm. Reviewed engorgement, treatments and preventions. Numerous questions from mother answered. Referred her to [facility] for review. Also made patient aware of lactation follow up phone call due to shield use and encouraged her to call for assistance or any questions if needed prior to phone call.

## 2017-09-24 NOTE — PLAN OF CARE
Problem: Postpartum (Vaginal Delivery) (Adult,Obstetrics,Pediatric)  Goal: Signs and Symptoms of Listed Potential Problems Will be Absent, Minimized or Managed (Postpartum)  Signs and symptoms of listed potential problems will be absent, minimized or managed by discharge/transition of care (reference Postpartum (Vaginal Delivery) (Adult,Obstetrics,Pediatric) CPG).   Outcome: Improving  Meeting expected outcomes. Using Ibu and Tylenol with good relief. Breastfeeding very well with shield. Discharge instructions reviewed by Jorge RICHMOND LPN and all questions answered. Pt has pump at home and was given Ibu 800mg for use at home. Pt discharged home with baby at 1200.

## 2017-09-24 NOTE — DISCHARGE INSTRUCTIONS
Postpartum pain control:    You will likely experience cramping for 1-2 weeks.   You can take up to ibuprofen 800mg every 8 hours as needed for pain.  You can additionally take 1-2 tabs of tylenol (325-650mg regular strength 500-1000mg extra strength), every 6 hours for additional pain control as needed.  It is best to alternate your medications if you are having continued pain.    If you have vaginal pain you can take sitz baths 1-2x/day. Fill the tub with 2-3 inches of warm water and soak the perineal and vaginal area for 10 minutes. Ice or warm packs can also be applied for comfort.    Please call the office for:  Temperature above 100.4  Severe headache, especially with changes in your vision  Heavy bleeding (more than one pad an hour for more than 2 hours in a row)  Any other new, concerning symptoms.    Constipation  You may use the following products to ease constipation:    1. Stool softeners such as metamucil or benefiber  2. Senna 1-2 times daily  3. Fiber supplements  4. Miralax (over the counter).  5. Dulcolax    Please be sure to keep adequately hydrated; 6-8 8oz glasses daily, more if needed to compensate for exercise, sweating, etc.      More specific dosing can be found below:    - metamucil 28g daily PO with 8oz of water  - senna-docusate 8.6-50 MG per tablet PO 1 tablet, Oral, 2 TIMES DAILY, Start with 1 tablet PO BID, reduce to 1 tablet daily when having daily BMs. Stop for loose stools.  - docusate sodium (COLACE) capsule 100 mg, Oral, 2 TIMES DAILY, To prevent constipation. Hold for loose stools.  - bisacodyl (DULCOLAX) suppository 10 mg, Rectal, DAILY PRN, constipation, Hold for loose stools.       Please contact the clinic with any questions.  Please schedule a follow up appointment with your primary OB/Gyn provider in 6 weeks.   You can respond with [employer] or call [facility] at [phone].    Postpartum Vaginal Delivery Instructions    Follow up in clinic at 6 weeks post partum    Home  [facility]   [phone]    Lactation  [phone]      Activity       Ask family and friends for help when you need it.    Do not place anything in your vagina for 6 weeks.    You are not restricted on other activities, but take it easy for a few weeks to allow your body to recover from delivery.  You are able to do any activities you feel up to that point.    No driving until you have stopped taking your pain medications (usually two weeks after delivery).     Call your health care provider if you have any of these symptoms:       Increased pain, swelling, redness, or fluid around your stiches from an episiotomy or perineal tear.    A fever above 100.4 F (38 C) with or without chills when placing a thermometer under your tongue.    You soak a sanitary pad with blood within 1 hour, or you see blood clots larger than a golf ball.    Bleeding that lasts more than 6 weeks.    Vaginal discharge that smells bad.    Severe pain, cramping or tenderness in your lower belly area.    A need to urinate more frequently (use the toilet more often), more urgently (use the toilet very quickly), or it burns when you urinate.    Nausea and vomiting.    Redness, swelling or pain around a vein in your leg.    Problems breastfeeding or a red or painful area on your breast.    Chest pain and cough or are gasping for air.    Problems coping with sadness, anxiety, or depression.  If you have any concerns about hurting yourself or the baby, call your provider immediately.     You have questions or concerns after you return home.     Keep your hands clean:  Always wash your hands before touching your perineal area and stitches.  This helps reduce your risk of infection.  If your hands aren't dirty, you may use an alcohol hand-rub to clean your hands. Keep your nails clean and short.

## 2017-10-02 ENCOUNTER — TELEPHONE (OUTPATIENT)
Dept: OBGYN | Facility: CLINIC | Age: 32
End: 2017-10-02

## 2017-10-03 ENCOUNTER — OFFICE VISIT (OUTPATIENT)
Dept: FAMILY MEDICINE | Facility: CLINIC | Age: 32
End: 2017-10-03
Payer: COMMERCIAL

## 2017-10-03 VITALS
HEART RATE: 81 BPM | DIASTOLIC BLOOD PRESSURE: 71 MMHG | TEMPERATURE: 98.1 F | SYSTOLIC BLOOD PRESSURE: 115 MMHG | RESPIRATION RATE: 14 BRPM | OXYGEN SATURATION: 98 %

## 2017-10-03 DIAGNOSIS — N30.01 ACUTE CYSTITIS WITH HEMATURIA: Primary | ICD-10-CM

## 2017-10-03 LAB
ALBUMIN UR-MCNC: NEGATIVE MG/DL
APPEARANCE UR: CLEAR
BILIRUB UR QL STRIP: NEGATIVE
COLOR UR AUTO: YELLOW
GLUCOSE UR STRIP-MCNC: NEGATIVE MG/DL
HGB UR QL STRIP: ABNORMAL
KETONES UR STRIP-MCNC: NEGATIVE MG/DL
LEUKOCYTE ESTERASE UR QL STRIP: ABNORMAL
NITRATE UR QL: NEGATIVE
PH UR STRIP: 5.5 PH (ref 5–7)
RBC #/AREA URNS AUTO: NORMAL /HPF
SOURCE: ABNORMAL
SP GR UR STRIP: <=1.005 (ref 1–1.03)
UROBILINOGEN UR STRIP-ACNC: 0.2 EU/DL (ref 0.2–1)
WBC #/AREA URNS AUTO: NORMAL /HPF

## 2017-10-03 PROCEDURE — 81001 URINALYSIS AUTO W/SCOPE: CPT | Performed by: FAMILY MEDICINE

## 2017-10-03 PROCEDURE — 99213 OFFICE O/P EST LOW 20 MIN: CPT | Performed by: FAMILY MEDICINE

## 2017-10-03 RX ORDER — CIPROFLOXACIN 250 MG/1
250 TABLET, FILM COATED ORAL 2 TIMES DAILY
Qty: 6 TABLET | Refills: 0 | Status: SHIPPED | OUTPATIENT
Start: 2017-10-03 | End: 2017-11-09

## 2017-10-03 NOTE — MR AVS SNAPSHOT
After Visit Summary   10/3/2017    Kendal Miranda    MRN: [id number]           Patient Information     Date Of Birth          1985        Visit Information        Provider Department      10/3/2017 10:00 AM Liseth Sanford, DO [facility]        Today's Diagnoses     Acute cystitis with hematuria    -  1       Follow-ups after your visit        Who to contact     If you have questions or need follow up information about today's clinic visit or your schedule please contact [facility] directly at [phone].  Normal or non-critical lab and imaging results will be communicated to you by [employer], letter or phone within 4 business days after the clinic has received the results. If you do not hear from us within 7 days, please contact the clinic through "[employer]t or phone. If you have a critical or abnormal lab result, we will notify you by phone as soon as possible.  Submit refill requests through [employer] or call your pharmacy and they will forward the refill request to us. Please allow 3 business days for your refill to be completed.          Additional Information About Your Visit        MyChart Information     [employer] gives you secure access to your electronic health record. If you see a primary care provider, you can also send messages to your care team and make appointments. If you have questions, please call your primary care clinic.  If you do not have a primary care provider, please call [phone] and they will assist you.        Care EveryWhere ID     This is your Care EveryWhere ID. This could be used by other organizations to access your [facility] medical records  [id number]        Your Vitals Were     Pulse Temperature Respirations Last Period Pulse Oximetry Breastfeeding?    81 98.1  F (36.7  C) 14 12/15/2016 (Exact Date) 98% Yes       Blood Pressure from Last 3 Encounters:   10/03/17 115/71   09/24/17 134/71   09/21/17 124/70    Weight from Last  3 Encounters:   09/21/17 197 lb (89.4 kg)   09/17/17 196 lb (88.9 kg)   09/14/17 196 lb 9.6 oz (89.2 kg)              We Performed the Following     UA reflex to Microscopic and Culture     Urine Microscopic          Today's Medication Changes          These changes are accurate as of: 10/3/17 10:40 AM.  If you have any questions, ask your nurse or doctor.               Start taking these medicines.        Dose/Directions    ciprofloxacin 250 MG tablet   Commonly known as:  CIPRO   Used for:  Acute cystitis with hematuria   Started by:  Liseth Sanford,         Dose:  250 mg   Take 1 tablet (250 mg) by mouth 2 times daily   Quantity:  6 tablet   Refills:  0            Where to get your medicines      These medications were sent to [facility] [place] [id number]  [id number] [place] [id number]     Phone:  [phone]     ciprofloxacin 250 MG tablet                Primary Care Provider Office Phone # Fax #    Lindy Rachele Haase, APRN [phone] [address] [facility] [address]        Equal Access to Services     YAHAIRA RAMESH : Hadii kirti ku hadasho Sojace, waaxda luqadaha, qaybta kaalmada adechrisyaignacio, bg singh . So [facility] [phone].    ATENCIÓN: Si habla español, tiene a sal disposición servicios gratuitos de asistencia lingüística. Llame al [phone].    We comply with applicable federal civil rights laws and [place] laws. We do not discriminate on the basis of race, color, national origin, age, disability, sex, sexual orientation, or gender identity.            Thank you!     Thank you for choosing [facility]  for your care. Our goal is always to provide you with excellent care. Hearing back from our patients is one way we can continue to improve our services. Please take a few minutes to complete the written survey that you may receive in the mail after your visit with us. Thank  you!             Your Updated Medication List - Protect others around you: Learn how to safely use, store and throw away your medicines at www.disposemymeds.org.          This list is accurate as of: 10/3/17 10:40 AM.  Always use your most recent med list.                   Brand Name Dispense Instructions for use Diagnosis    breast pump Misc     1 each    1 each as needed    Normal first pregnancy in third trimester       calcium carbonate 500 MG chewable tablet    TUMS     Take 1 chew tab by mouth daily        ciprofloxacin 250 MG tablet    CIPRO    6 tablet    Take 1 tablet (250 mg) by mouth 2 times daily    Acute cystitis with hematuria       ibuprofen 800 MG tablet    ADVIL/MOTRIN    30 tablet    Take 1 tablet (800 mg) by mouth every 8 hours as needed for moderate pain     (spontaneous vaginal delivery)       loratadine 10 MG tablet    CLARITIN    30 tablet    Take 1 tablet (10 mg) by mouth daily    Upper respiratory tract infection, unspecified type       PRENATAL COMPLETE 14-0.4 MG Tabs

## 2017-10-03 NOTE — PROGRESS NOTES
SUBJECTIVE:   Kendal Miranda is a 32 year old female who presents to clinic today for the following health issues:      URINARY TRACT SYMPTOMS  Onset: 3 days     Description:   Painful urination (Dysuria): YES  Blood in urine (Hematuria): YES  Delay in urine (Hesitency): no     Intensity: mild, moderate    Progression of Symptoms:  same    Accompanying Signs & Symptoms:  Fever/chills: no   Flank pain YES  Nausea and vomiting: no   Any vaginal symptoms: and vaginal discharge  Abdominal/Pelvic Pain: YES    History:   History of frequent UTI's: no   History of kidney stones: no   Sexually Active: YES  Possibility of pregnancy: No, just a baby 2 weeks ago     Precipitating factors:   Recent vaginal delivery with a faraz-urethral tear    Therapies Tried and outcome: Cranberry juice prn (contraindicated in Coumadin patients)          Problem list and histories reviewed & adjusted, as indicated.  Additional history: as documented    Patient Active Problem List   Diagnosis     CARDIOVASCULAR SCREENING; LDL GOAL LESS THAN 160     High grade squamous intraepithelial dysplasia     S/P LEEP (loop electrosurgical excision procedure)     Anxiety     Pregnant state, incidental     Indication for care in labor or delivery     Encounter for triage in pregnant patient      (spontaneous vaginal delivery)     Past Surgical History:   Procedure Laterality Date     COLP,CX W/UP ADJ VAG,W/BX, CX  2013    MAXIMO 1. 11, 111     HC TOOTH EXTRACTION W/FORCEP  2006     LEEP TX, CERVICAL  2013       Social History   Substance Use Topics     Smoking status: Never Smoker     Smokeless tobacco: Never Used      Comment: non smoking home     Alcohol use 0.0 oz/week     0 Standard drinks or equivalent per week      Comment: spring break     Family History   Problem Relation Age of Onset     Hypertension Mother      Hypertension Maternal Grandmother      DIABETES No family hx of      CANCER No family hx of              Reviewed and updated as  needed this visit by clinical staff     Reviewed and updated as needed this visit by Provider         ROS:  Constitutional, HEENT, cardiovascular, pulmonary, gi and gu systems are negative, except as otherwise noted.      OBJECTIVE:   /71 (BP Location: Right arm, Patient Position: Chair, Cuff Size: Adult Large)  Pulse 81  Temp 98.1  F (36.7  C)  Resp 14  LMP 12/15/2016 (Exact Date)  SpO2 98%  Breastfeeding? Yes  There is no height or weight on file to calculate BMI.  GENERAL: healthy, alert and no distress  ABDOMEN: tenderness suprapubic and bowel sounds normal    Diagnostic Test Results:  Results for orders placed or performed in visit on 10/03/17 (from the past 24 hour(s))   UA reflex to Microscopic and Culture   Result Value Ref Range    Color Urine Yellow     Appearance Urine Clear     Glucose Urine Negative NEG^Negative mg/dL    Bilirubin Urine Negative NEG^Negative    Ketones Urine Negative NEG^Negative mg/dL    Specific Gravity Urine <=1.005 1.003 - 1.035    Blood Urine Small (A) NEG^Negative    pH Urine 5.5 5.0 - 7.0 pH    Protein Albumin Urine Negative NEG^Negative mg/dL    Urobilinogen Urine 0.2 0.2 - 1.0 EU/dL    Nitrite Urine Negative NEG^Negative    Leukocyte Esterase Urine Trace (A) NEG^Negative    Source Midstream Urine    Urine Microscopic   Result Value Ref Range    WBC Urine O - 2 OTO2^O - 2 /HPF    RBC Urine O - 2 OTO2^O - 2 /HPF       ASSESSMENT/PLAN:     1. Acute cystitis with hematuria  - UA reflex to Microscopic and Culture; Future  - UA reflex to Microscopic and Culture  - Urine Microscopic  - ciprofloxacin (CIPRO) 250 MG tablet; Take 1 tablet (250 mg) by mouth 2 times daily  Dispense: 6 tablet; Refill: 0    Follow up if symptoms are worsening or not improving    Liseth Sanford, DO  [facility]

## 2017-10-03 NOTE — NURSING NOTE
"Chief Complaint   Patient presents with     UTI     3 days        Initial /71 (BP Location: Right arm, Patient Position: Chair, Cuff Size: Adult Large)  Pulse 81  Temp 98.1  F (36.7  C)  Resp 14  LMP 12/15/2016 (Exact Date)  SpO2 98%  Breastfeeding? Yes Estimated body mass index is 32.28 kg/(m^2) as calculated from the following:    Height as of 9/21/17: 5' 5.5\" (1.664 m).    Weight as of 9/21/17: 197 lb (89.4 kg).  Medication Reconciliation: complete   "

## 2017-10-09 ENCOUNTER — OFFICE VISIT (OUTPATIENT)
Dept: FAMILY MEDICINE | Facility: CLINIC | Age: 32
End: 2017-10-09
Payer: COMMERCIAL

## 2017-10-09 VITALS
DIASTOLIC BLOOD PRESSURE: 71 MMHG | TEMPERATURE: 98 F | HEIGHT: 66 IN | SYSTOLIC BLOOD PRESSURE: 116 MMHG | HEART RATE: 104 BPM | RESPIRATION RATE: 16 BRPM

## 2017-10-09 DIAGNOSIS — N61.0 MASTITIS: Primary | ICD-10-CM

## 2017-10-09 PROCEDURE — 99214 OFFICE O/P EST MOD 30 MIN: CPT | Performed by: FAMILY MEDICINE

## 2017-10-09 RX ORDER — CEPHALEXIN 500 MG/1
500 CAPSULE ORAL 4 TIMES DAILY
Qty: 40 CAPSULE | Refills: 0 | Status: SHIPPED | OUTPATIENT
Start: 2017-10-09 | End: 2017-11-09

## 2017-10-09 NOTE — NURSING NOTE
"Chief Complaint   Patient presents with     Breast Problem       Initial LMP 12/15/2016 (Exact Date) Estimated body mass index is 32.28 kg/(m^2) as calculated from the following:    Height as of 9/21/17: 5' 5.5\" (1.664 m).    Weight as of 9/21/17: 197 lb (89.4 kg).  Medication Reconciliation: complete rt arm Elizabeth Fletcher MA      "

## 2017-10-09 NOTE — MR AVS SNAPSHOT
"              After Visit Summary   10/9/2017    Kendal Miranda    MRN: [id number]           Patient Information     Date Of Birth          1985        Visit Information        Provider Department      10/9/2017 5:00 PM Sergey Dodd MD [facility]        Today's Diagnoses     Mastitis    -  1       Follow-ups after your visit        Who to contact     If you have questions or need follow up information about today's clinic visit or your schedule please contact [facility] directly at [phone].  Normal or non-critical lab and imaging results will be communicated to you by MyChart, letter or phone within 4 business days after the clinic has received the results. If you do not hear from us within 7 days, please contact the clinic through [employer] or phone. If you have a critical or abnormal lab result, we will notify you by phone as soon as possible.  Submit refill requests through Yola or call your pharmacy and they will forward the refill request to us. Please allow 3 business days for your refill to be completed.          Additional Information About Your Visit        MyChart Information     Yola gives you secure access to your electronic health record. If you see a primary care provider, you can also send messages to your care team and make appointments. If you have questions, please call your primary care clinic.  If you do not have a primary care provider, please call [phone] and they will assist you.        Care EveryWhere ID     This is your Care EveryWhere ID. This could be used by other organizations to access your [place] medical records  [id number]        Your Vitals Were     Pulse Temperature Respirations Height Last Period       104 98  F (36.7  C) 16 5' 5.5\" (1.664 m) 12/15/2016 (Exact Date)        Blood Pressure from Last 3 Encounters:   10/09/17 116/71   10/03/17 115/71   09/24/17 134/71    Weight from Last 3 Encounters:   09/21/17 197 lb " (89.4 kg)   09/17/17 196 lb (88.9 kg)   09/14/17 196 lb 9.6 oz (89.2 kg)              Today, you had the following     No orders found for display         Today's Medication Changes          These changes are accurate as of: 10/9/17 11:59 PM.  If you have any questions, ask your nurse or doctor.               Start taking these medicines.        Dose/Directions    cephALEXin 500 MG capsule   Commonly known as:  KEFLEX   Used for:  Mastitis   Started by:  Sergey Dodd MD        Dose:  500 mg   Take 1 capsule (500 mg) by mouth 4 times daily   Quantity:  40 capsule   Refills:  0            Where to get your medicines      These medications were sent to [facility] [id number] [place] [address]  [id number] [place] [address]     Phone:  [phone]     cephALEXin 500 MG capsule                Primary Care Provider Office Phone # Fax #    Susan Rachele Haase, APRN -[phone] [address] [facility] [address]        Equal Access to Services     [facility]: Hadii aad ku hadasho Soomaali, waaxda luqadaha, qaybta kaalmada adeegyada, waxay idiin haymelissa singh . So [facility] [phone].    ATENCIÓN: Si habla español, tiene a sal disposición servicios gratuitos de asistencia lingüística. Llame al [phone].    We comply with applicable federal civil rights laws and [place] laws. We do not discriminate on the basis of race, color, national origin, age, disability, sex, sexual orientation, or gender identity.            Thank you!     Thank you for choosing [facility]  for your care. Our goal is always to provide you with excellent care. Hearing back from our patients is one way we can continue to improve our services. Please take a few minutes to complete the written survey that you may receive in the mail after your visit with us. Thank you!             Your Updated Medication List - Protect others around you: Learn how to  safely use, store and throw away your medicines at www.disposemymeds.org.          This list is accurate as of: 10/9/17 11:59 PM.  Always use your most recent med list.                   Brand Name Dispense Instructions for use Diagnosis    breast pump Misc     1 each    1 each as needed    Normal first pregnancy in third trimester       calcium carbonate 500 MG chewable tablet    TUMS     Take 1 chew tab by mouth daily        cephALEXin 500 MG capsule    KEFLEX    40 capsule    Take 1 capsule (500 mg) by mouth 4 times daily    Mastitis       ciprofloxacin 250 MG tablet    CIPRO    6 tablet    Take 1 tablet (250 mg) by mouth 2 times daily    Acute cystitis with hematuria       ibuprofen 800 MG tablet    ADVIL/MOTRIN    30 tablet    Take 1 tablet (800 mg) by mouth every 8 hours as needed for moderate pain     (spontaneous vaginal delivery)       loratadine 10 MG tablet    CLARITIN    30 tablet    Take 1 tablet (10 mg) by mouth daily    Upper respiratory tract infection, unspecified type       PRENATAL COMPLETE 14-0.4 MG Tabs

## 2017-10-09 NOTE — PROGRESS NOTES
SUBJECTIVE:   Kendal Miranda is a 32 year old female who presents to clinic today for the following health issues:  Mastitis  (primary encounter diagnosis)  Concern - Mastitis   Onset: a few days variable transient pain and redness right breast    Description:       Intensity:     Progression of Symptoms:      Accompanying Signs & Symptoms:  Temperature of 100 this morning, body aches, shooting pain on right side    Previous history of similar problem:   none    Precipitating factors:   Worsened by:     Alleviating factors:  Improved by:     Therapies Tried and outcome: none  Temp of 100 this morning, body aches, shooting pain on R side    R breast hurts          Problem list and histories reviewed & adjusted, as indicated.  Additional history: as documented        Reviewed and updated as needed this visit by clinical staff      Past Medical History:   Diagnosis Date     ASCUS on Pap smear 11/10/14    Neg HPV     High grade squamous intraepithelial lesion of cervix 1/13     LSIL (low grade squamous intraepithelial lesion) on Pap smear      S/P LEEP of cervix 7/12/13    pt lives in [facility]       Past Surgical History:   Procedure Laterality Date     COLP,CX W/UP ADJ VAG,W/BX, CX  5/6/2013    MAXIMO 1. 11, 111     HC TOOTH EXTRACTION W/FORCEP  2006     LEEP TX, CERVICAL  2013       Family History   Problem Relation Age of Onset     Hypertension Mother      Hypertension Maternal Grandmother      DIABETES No family hx of      CANCER No family hx of        Social History   Substance Use Topics     Smoking status: Never Smoker     Smokeless tobacco: Never Used      Comment: non smoking home     Alcohol use 0.0 oz/week     0 Standard drinks or equivalent per week      Comment: spring break       Reviewed and updated as needed this visit by Provider         ROS: Little cough, no stuffy nose or sore throat      OBJECTIVE:     /71 (BP Location: Right arm, Patient Position: Chair, Cuff Size: Adult Large)  Pulse 104   "Temp 98  F (36.7  C)  Resp 16  Ht 1.664 m (5' 5.5\")  LMP 12/15/2016 (Exact Date)  There is no height or weight on file to calculate BMI.    BREAST without mass, discharge, nor axillary adenopathy Breast feeding changes. Faint erythema medial midline        ASSESSMENT/PLAN:   ASSESSMENT / PLAN:  (N61.0) Mastitis  (primary encounter diagnosis)  Comment:Hx suggestive, exam less so  treat  Plan: cephALEXin (KEFLEX) 500 MG capsule        Breast drainage, feeding discussed  RTC as needed    The information in this document, created by the medical scribe for me, accurately reflects the services I personally performed and the decisions made by me. I have reviewed and approved this document for accuracy prior to leaving the patient care area.  Sergey Dodd MD October 9, 2017 5:02 PM    Sergey Dodd MD  [facility]  "

## 2017-11-09 ENCOUNTER — PRENATAL OFFICE VISIT (OUTPATIENT)
Dept: OBGYN | Facility: CLINIC | Age: 32
End: 2017-11-09
Payer: COMMERCIAL

## 2017-11-09 VITALS
WEIGHT: 180.5 LBS | DIASTOLIC BLOOD PRESSURE: 62 MMHG | BODY MASS INDEX: 29.58 KG/M2 | HEART RATE: 80 BPM | SYSTOLIC BLOOD PRESSURE: 112 MMHG

## 2017-11-09 PROCEDURE — 99207 ZZC POST PARTUM EXAM: CPT | Performed by: OBSTETRICS & GYNECOLOGY

## 2017-11-09 RX ORDER — ACETAMINOPHEN AND CODEINE PHOSPHATE 120; 12 MG/5ML; MG/5ML
1 SOLUTION ORAL DAILY
Qty: 112 TABLET | Refills: 3 | Status: SHIPPED | OUTPATIENT
Start: 2017-11-09 | End: 2019-01-10

## 2017-11-09 ASSESSMENT — PATIENT HEALTH QUESTIONNAIRE - PHQ9: SUM OF ALL RESPONSES TO PHQ QUESTIONS 1-9: 5

## 2017-11-09 NOTE — MR AVS SNAPSHOT
After Visit Summary   11/9/2017    Kendal Miranda    MRN: [id number]           Patient Information     Date Of Birth          1985        Visit Information        Provider Department      11/9/2017 2:30 PM Naa Ashley MD [facility]        Today's Diagnoses     Routine postpartum follow-up    -  1       Follow-ups after your visit        Who to contact     If you have questions or need follow up information about today's clinic visit or your schedule please contact [facility] directly at [phone].  Normal or non-critical lab and imaging results will be communicated to you by MyChart, letter or phone within 4 business days after the clinic has received the results. If you do not hear from us within 7 days, please contact the clinic through [email or web address] or phone. If you have a critical or abnormal lab result, we will notify you by phone as soon as possible.  Submit refill requests through [employer] or call your pharmacy and they will forward the refill request to us. Please allow 3 business days for your refill to be completed.          Additional Information About Your Visit        MyChart Information     [employer] gives you secure access to your electronic health record. If you see a primary care provider, you can also send messages to your care team and make appointments. If you have questions, please call your primary care clinic.  If you do not have a primary care provider, please call [phone] and they will assist you.        Care EveryWhere ID     This is your Care EveryWhere ID. This could be used by other organizations to access your [place] medical records  [id number]        Your Vitals Were     Pulse Last Period Breastfeeding? BMI (Body Mass Index)          80 12/15/2016 (Exact Date) Yes 29.58 kg/m2         Blood Pressure from Last 3 Encounters:   11/09/17 112/62   10/09/17 116/71   10/03/17 115/71    Weight from Last 3 Encounters:   11/09/17 180  lb 8 oz (81.9 kg)   09/21/17 197 lb (89.4 kg)   09/17/17 196 lb (88.9 kg)              Today, you had the following     No orders found for display         Today's Medication Changes          These changes are accurate as of: 11/9/17  3:35 PM.  If you have any questions, ask your nurse or doctor.               Start taking these medicines.        Dose/Directions    norethindrone 0.35 MG per tablet   Commonly known as:  MICRONOR   Used for:  Routine postpartum follow-up   Started by:  Naa Ashley MD        Dose:  1 tablet   Take 1 tablet (0.35 mg) by mouth daily   Quantity:  112 tablet   Refills:  3            Where to get your medicines      These medications were sent to [place] [address] [place] [facility] [address]  [address] [facility] [address]     Phone:  [phone]     norethindrone 0.35 MG per tablet                Primary Care Provider Office Phone # Fax #    Lindy Rachele Haase, APRN -[phone] [address] [facility] [address]        Equal Access to Services     [facility] : Hadii kirti ku hadasho Soomaali, waaxda luqadaha, qaybta kaalmada adeegyada, bg singh . So [facility] [phone].    ATENCIÓN: Si habla español, tiene a sal disposición servicios gratuitos de asistencia lingüística. [facility] [phone].    We comply with applicable federal civil rights laws and [place] laws. We do not discriminate on the basis of race, color, national origin, age, disability, sex, sexual orientation, or gender identity.            Thank you!     Thank you for choosing [facility]  for your care. Our goal is always to provide you with excellent care. Hearing back from our patients is one way we can continue to improve our services. Please take a few minutes to complete the written survey that you may receive in the mail after your visit with us. Thank you!             Your Updated Medication List - Protect others  around you: Learn how to safely use, store and throw away your medicines at www.disposemymeds.org.          This list is accurate as of: 11/9/17  3:35 PM.  Always use your most recent med list.                   Brand Name Dispense Instructions for use Diagnosis    breast pump Misc     1 each    1 each as needed    Normal first pregnancy in third trimester       loratadine 10 MG tablet    CLARITIN    30 tablet    Take 1 tablet (10 mg) by mouth daily    Upper respiratory tract infection, unspecified type       norethindrone 0.35 MG per tablet    MICRONOR    112 tablet    Take 1 tablet (0.35 mg) by mouth daily    Routine postpartum follow-up       PRENATAL COMPLETE 14-0.4 MG Tabs

## 2017-11-09 NOTE — NURSING NOTE
"Chief Complaint   Patient presents with     Post Partum Exam     delivered daughter- -        Initial /62 (BP Location: Right arm, Patient Position: Chair, Cuff Size: Adult Regular)  Pulse 80  Wt 180 lb 8 oz (81.9 kg)  LMP 12/15/2016 (Exact Date)  Breastfeeding? Yes  BMI 29.58 kg/m2 Estimated body mass index is 29.58 kg/(m^2) as calculated from the following:    Height as of 10/9/17: 5' 5.5\" (1.664 m).    Weight as of this encounter: 180 lb 8 oz (81.9 kg).  Medication Reconciliation: complete     Arie Ybarra CMA      "

## 2017-11-21 ENCOUNTER — OFFICE VISIT (OUTPATIENT)
Dept: FAMILY MEDICINE | Facility: CLINIC | Age: 32
End: 2017-11-21
Payer: COMMERCIAL

## 2017-11-21 VITALS
TEMPERATURE: 97.8 F | DIASTOLIC BLOOD PRESSURE: 72 MMHG | BODY MASS INDEX: 29.5 KG/M2 | HEART RATE: 84 BPM | WEIGHT: 180 LBS | SYSTOLIC BLOOD PRESSURE: 111 MMHG

## 2017-11-21 DIAGNOSIS — N64.4 BREAST PAIN, LEFT: Primary | ICD-10-CM

## 2017-11-21 PROCEDURE — 99213 OFFICE O/P EST LOW 20 MIN: CPT | Performed by: PHYSICIAN ASSISTANT

## 2017-11-21 RX ORDER — VALACYCLOVIR HYDROCHLORIDE 1 G/1
1000 TABLET, FILM COATED ORAL 3 TIMES DAILY
Qty: 21 TABLET | Refills: 0 | Status: SHIPPED | OUTPATIENT
Start: 2017-11-21 | End: 2018-04-04

## 2017-11-21 NOTE — PATIENT INSTRUCTIONS
Shingles  Shingles is a viral infection caused by the same virus as chicken pox. Anyone who has had chicken pox may get shingles later in life. The virus stays in the body, but remains dormant (asleep). Shingles often occurs in older persons or persons with lowered immunity. But it can affect anyone at any age.  Shingles starts as a tingling patch of skin on one side of the body. Small, painful blisters may then appear. The rash does not spread to the rest of the body.  Exposure to shingles cannot cause shingles. However, it can cause chicken pox in anyone who has not had chicken pox or has not been vaccinated. The contagious period ends when all blisters have crusted over (generally about 2 weeks after the illness begins).  After the blisters heal, the affected skin may be sensitive or painful for months (neuralgia). This often gradually goes away.  A shingles vaccine is available. This can help prevent shingles or make it less painful. It is generally recommended for adults over the age of 60 who have had chicken pox in the past, but who have never had shingles. Adults over 60 who have had neither chicken pox nor shingles can prevent both diseases with the chicken pox vaccine. Ask your healthcare provider about these vaccines.  Home care    Medicines may be prescribed to help relieve pain. Take these medicines as directed. Ask your healthcare provider or pharmacist before using over-the-counter medicines for helping treat pain and itching.    In certain cases, antiviral medicines may be prescribed to reduce pain, shorten the illness, and prevent neuralgia. Take these medicines as directed.    Compresses made from a solution of cool water mixed with cornstarch or baking soda may help relieve pain and itching.     Gently wash skin daily with soap and water to help prevent infection.  Be certain to rinse off all of the soap, which can be irritating.    Trim fingernails and try not to scratch. Scratching the sores  may leave scars.    Stay home from work or school until all blisters have formed a crust and you are no longer contagious.  Follow-up care  Follow up with your healthcare provider or as directed by our staff.  When to seek medical advice    Fever of 100.4 F (38 C) or higher, or as directed by your healthcare provider    Affected skin is on the face or neck and any of the following occur:    Headache    Eye pain    Changes in vision    Sores near the eye    Weakness of facial muscles    Pain, redness, or swelling of a joint    Signs of skin infection: colored drainage from the sores, warmth, increasing redness, or increasing pain  Date Last Reviewed: 9/25/2015 2000-2017 [employer]. [address] [place] [address], [place] [address]. All rights reserved. This information is not intended as a substitute for professional medical care. Always follow your healthcare professional's instructions.

## 2017-11-21 NOTE — PROGRESS NOTES
"  SUBJECTIVE:   Kendal Miranda is a 32 year old female who presents to clinic today for the following health issues:      -left breast-mild pain \"zing\", started , patient currently breastfeeding-using shield. No skin changes. No current pain. Worse with feeding. Only located on outer left breast. No nipple discharge. States viewed jose g mouth and no white plaque noted.     Problem list and histories reviewed & adjusted, as indicated.  Additional history: as documented    Patient Active Problem List   Diagnosis     CARDIOVASCULAR SCREENING; LDL GOAL LESS THAN 160     High grade squamous intraepithelial dysplasia     S/P LEEP (loop electrosurgical excision procedure)     Anxiety     Pregnant state, incidental     Indication for care in labor or delivery     Encounter for triage in pregnant patient      (spontaneous vaginal delivery)     Past Surgical History:   Procedure Laterality Date     COLP,CX W/UP ADJ VAG,W/BX, CX  2013    MAXIMO 1. 11, 111     HC TOOTH EXTRACTION W/FORCEP  2006     LEEP TX, CERVICAL  2013       Social History   Substance Use Topics     Smoking status: Never Smoker     Smokeless tobacco: Never Used      Comment: non smoking home     Alcohol use 0.0 oz/week     0 Standard drinks or equivalent per week      Comment: spring break     Family History   Problem Relation Age of Onset     Hypertension Mother      Hypertension Maternal Grandmother      DIABETES No family hx of      CANCER No family hx of          Current Outpatient Prescriptions   Medication Sig Dispense Refill     valACYclovir (VALTREX) 1000 mg tablet Take 1 tablet (1,000 mg) by mouth 3 times daily 21 tablet 0     norethindrone (MICRONOR) 0.35 MG per tablet Take 1 tablet (0.35 mg) by mouth daily 112 tablet 3     Misc. Devices (BREAST PUMP) MISC 1 each as needed 1 each 0     Prenatal Vit-Fe Fumarate-FA (PRENATAL COMPLETE) 14-0.4 MG TABS        loratadine (CLARITIN) 10 MG tablet Take 1 tablet (10 mg) by mouth daily 30 tablet 1 "     Allergies   Allergen Reactions     Sulfa Drugs Hives     BP Readings from Last 3 Encounters:   11/21/17 111/72   11/09/17 112/62   10/09/17 116/71    Wt Readings from Last 3 Encounters:   11/21/17 180 lb (81.6 kg)   11/09/17 180 lb 8 oz (81.9 kg)   09/21/17 197 lb (89.4 kg)                        Reviewed and updated as needed this visit by clinical staffTobacco  Allergies  Meds  Problems  Med Hx  Surg Hx  Fam Hx  Soc Hx        Reviewed and updated as needed this visit by Provider  Allergies  Meds  Problems         ROS:  Constitutional, skin, cardiovascular, pulmonary, gi and gu systems are negative, except as otherwise noted.      OBJECTIVE:   /72 (BP Location: Right arm, Patient Position: Chair, Cuff Size: Adult Large)  Pulse 84  Temp 97.8  F (36.6  C) (Oral)  Wt 180 lb (81.6 kg)  LMP 12/15/2016 (Exact Date)  Breastfeeding? Yes  BMI 29.5 kg/m2  Body mass index is 29.5 kg/(m^2).  GENERAL: healthy, alert and no distress  BREAST: normal without masses, tenderness or nipple discharge  SKIN: no rash or erythema. No tenderness to palpation.     Diagnostic Test Results:  none     ASSESSMENT/PLAN:     (N64.4) Breast pain, left  (primary encounter diagnosis)  Comment: unclear of cause. No evidence of mastitis or candidiasis on exam. May be local nerve irritation from engorged breasts. However, shingles onset is possible as well. This would be concern since breastfeeding. Is going out of [place] tomorrow. Will give watch and wait prescription to take if rash develops and educated on avoiding contact with infant to area while rash is present. Otherwise, follow up prn    Plan: valACYclovir (VALTREX) 1000 mg tablet        -Medication use and side effects discussed with the patient. Patient is in complete understanding and agreement with plan.         Follow up: as above     Mac Motley PA-C  [facility] [place]

## 2017-11-21 NOTE — MR AVS SNAPSHOT
After Visit Summary   11/21/2017    Kendal Miranda    MRN: [id number]           Patient Information     Date Of Birth          1985        Visit Information        Provider Department      11/21/2017 10:30 AM Mac Motley PA-C [facility]        Today's Diagnoses     Breast pain, left    -  1      Care Instructions      Shingles  Shingles is a viral infection caused by the same virus as chicken pox. Anyone who has had chicken pox may get shingles later in life. The virus stays in the body, but remains dormant (asleep). Shingles often occurs in older persons or persons with lowered immunity. But it can affect anyone at any age.  Shingles starts as a tingling patch of skin on one side of the body. Small, painful blisters may then appear. The rash does not spread to the rest of the body.  Exposure to shingles cannot cause shingles. However, it can cause chicken pox in anyone who has not had chicken pox or has not been vaccinated. The contagious period ends when all blisters have crusted over (generally about 2 weeks after the illness begins).  After the blisters heal, the affected skin may be sensitive or painful for months (neuralgia). This often gradually goes away.  A shingles vaccine is available. This can help prevent shingles or make it less painful. It is generally recommended for adults over the age of 60 who have had chicken pox in the past, but who have never had shingles. Adults over 60 who have had neither chicken pox nor shingles can prevent both diseases with the chicken pox vaccine. Ask your healthcare provider about these vaccines.  Home care    Medicines may be prescribed to help relieve pain. Take these medicines as directed. Ask your healthcare provider or pharmacist before using over-the-counter medicines for helping treat pain and itching.    In certain cases, antiviral medicines may be prescribed to reduce pain, shorten the illness, and prevent  neuralgia. Take these medicines as directed.    Compresses made from a solution of cool water mixed with cornstarch or baking soda may help relieve pain and itching.     Gently wash skin daily with soap and water to help prevent infection.  Be certain to rinse off all of the soap, which can be irritating.    Trim fingernails and try not to scratch. Scratching the sores may leave scars.    Stay home from work or school until all blisters have formed a crust and you are no longer contagious.  Follow-up care  Follow up with your healthcare provider or as directed by our staff.  When to seek medical advice    Fever of 100.4 F (38 C) or higher, or as directed by your healthcare provider    Affected skin is on the face or neck and any of the following occur:    Headache    Eye pain    Changes in vision    Sores near the eye    Weakness of facial muscles    Pain, redness, or swelling of a joint    Signs of skin infection: colored drainage from the sores, warmth, increasing redness, or increasing pain  Date Last Reviewed: 9/25/2015 2000-2017 [employer]. [address] [place] [address]. All rights reserved. This information is not intended as a substitute for professional medical care. Always follow your healthcare professional's instructions.                Follow-ups after your visit        Who to contact     If you have questions or need follow up information about today's clinic visit or your schedule please contact [facility] directly at [phone].  Normal or non-critical lab and imaging results will be communicated to you by MyChart, letter or phone within 4 business days after the clinic has received the results. If you do not hear from us within 7 days, please contact the clinic through MyChart or phone. If you have a critical or abnormal lab result, we will notify you by phone as soon as possible.  Submit refill requests through [employer] or call your pharmacy and they  will forward the refill request to us. Please allow 3 business days for your refill to be completed.          Additional Information About Your Visit        [employer] Information     [employer] gives you secure access to your electronic health record. If you see a primary care provider, you can also send messages to your care team and make appointments. If you have questions, please call your primary care clinic.  If you do not have a primary care provider, please call [phone] and they will assist you.        Care EveryWhere ID     This is your Care EveryWhere ID. This could be used by other organizations to access your [place] medical records  [id number]        Your Vitals Were     Pulse Temperature Last Period Breastfeeding? BMI (Body Mass Index)       84 97.8  F (36.6  C) (Oral) 12/15/2016 (Exact Date) Yes 29.5 kg/m2        Blood Pressure from Last 3 Encounters:   11/21/17 111/72   11/09/17 112/62   10/09/17 116/71    Weight from Last 3 Encounters:   11/21/17 180 lb (81.6 kg)   11/09/17 180 lb 8 oz (81.9 kg)   09/21/17 197 lb (89.4 kg)              Today, you had the following     No orders found for display         Today's Medication Changes          These changes are accurate as of: 11/21/17 11:11 AM.  If you have any questions, ask your nurse or doctor.               Start taking these medicines.        Dose/Directions    valACYclovir 1000 mg tablet   Commonly known as:  VALTREX   Used for:  Breast pain, left   Started by:  Mac Motley PA-C        Dose:  1000 mg   Take 1 tablet (1,000 mg) by mouth 3 times daily   Quantity:  21 tablet   Refills:  0            Where to get your medicines      Some of these will need a paper prescription and others can be bought over the counter.  Ask your nurse if you have questions.     Bring a paper prescription for each of these medications     valACYclovir 1000 mg tablet                Primary Care Provider Office Phone # Fax #    Susan Rachele Haase, APRN CNP  [phone]       [id number]  [facility] [address]        Equal Access to Services     YAHAIRA RAMESH : Hadii aad ku hadcholodayanara Linette, wasumanda ciscokhadra, isaiahta kamichelada brenda, bg grayzach javon. So [facility] [phone].    ATENCIÓN: Si habla español, tiene a sal disposición servicios gratuitos de asistencia lingüística. Llame al [phone].    We comply with applicable federal civil rights laws and [place] laws. We do not discriminate on the basis of race, color, national origin, age, disability, sex, sexual orientation, or gender identity.            Thank you!     Thank you for choosing [facility]  for your care. Our goal is always to provide you with excellent care. Hearing back from our patients is one way we can continue to improve our services. Please take a few minutes to complete the written survey that you may receive in the mail after your visit with us. Thank you!             Your Updated Medication List - Protect others around you: Learn how to safely use, store and throw away your medicines at www.disposemymeds.org.          This list is accurate as of: 11/21/17 11:11 AM.  Always use your most recent med list.                   Brand Name Dispense Instructions for use Diagnosis    breast pump Misc     1 each    1 each as needed    Normal first pregnancy in third trimester       loratadine 10 MG tablet    CLARITIN    30 tablet    Take 1 tablet (10 mg) by mouth daily    Upper respiratory tract infection, unspecified type       norethindrone 0.35 MG per tablet    MICRONOR    112 tablet    Take 1 tablet (0.35 mg) by mouth daily    Routine postpartum follow-up       PRENATAL COMPLETE 14-0.4 MG Tabs           valACYclovir 1000 mg tablet    VALTREX    21 tablet    Take 1 tablet (1,000 mg) by mouth 3 times daily    Breast pain, left

## 2018-04-04 ENCOUNTER — TELEPHONE (OUTPATIENT)
Dept: FAMILY MEDICINE | Facility: CLINIC | Age: 33
End: 2018-04-04

## 2018-04-04 ENCOUNTER — OFFICE VISIT (OUTPATIENT)
Dept: FAMILY MEDICINE | Facility: CLINIC | Age: 33
End: 2018-04-04
Payer: COMMERCIAL

## 2018-04-04 VITALS
WEIGHT: 184 LBS | HEART RATE: 81 BPM | SYSTOLIC BLOOD PRESSURE: 106 MMHG | TEMPERATURE: 97.8 F | RESPIRATION RATE: 14 BRPM | HEIGHT: 66 IN | BODY MASS INDEX: 29.57 KG/M2 | DIASTOLIC BLOOD PRESSURE: 72 MMHG

## 2018-04-04 DIAGNOSIS — J02.0 STREP PHARYNGITIS: Primary | ICD-10-CM

## 2018-04-04 LAB
DEPRECATED S PYO AG THROAT QL EIA: ABNORMAL
SPECIMEN SOURCE: ABNORMAL

## 2018-04-04 PROCEDURE — 99213 OFFICE O/P EST LOW 20 MIN: CPT | Performed by: PHYSICIAN ASSISTANT

## 2018-04-04 PROCEDURE — 87880 STREP A ASSAY W/OPTIC: CPT | Performed by: PHYSICIAN ASSISTANT

## 2018-04-04 RX ORDER — AMOXICILLIN 500 MG/1
500 CAPSULE ORAL 2 TIMES DAILY
Qty: 20 CAPSULE | Refills: 0 | Status: SHIPPED | OUTPATIENT
Start: 2018-04-04 | End: 2018-04-14

## 2018-04-04 NOTE — TELEPHONE ENCOUNTER
Please call patient. Rapid strep is positive. I have sent an abx to her pharmacy. Amoxicillin BID for 10 days. If no improvement in 3-5 days, patient should RTC. Sooner if worsening.     -Jigar Motley, PAC

## 2018-04-04 NOTE — MR AVS SNAPSHOT
After Visit Summary   4/4/2018    Kendal Miranda    MRN: [id number]           Patient Information     Date Of Birth          1985        Visit Information        Provider Department      4/4/2018 7:00 AM Mac Motley PA-C [facility]        Today's Diagnoses     Throat pain    -  1      Care Instructions      When You Have a Sore Throat    A sore throat can be painful. There are many reasons why you may have a sore throat. Your healthcare provider will work with you to find the cause of your sore throat. He or she will also find the best treatment for you.  What causes a sore throat?  Sore throats can be caused or worsened by:    Cold or flu viruses    Bacteria    Irritants such as tobacco smoke or air pollution    Acid reflux  A healthy throat  The tonsils are on the sides of the throat near the base of the tongue. They collect viruses and bacteria and help fight infection. The throat (pharynx) is the passage for air. Mucus from the nasal cavity also moves down the passage.  An inflamed throat  The tonsils and pharynx can become inflamed due to a cold or flu virus. Postnasal drip (excess mucus draining from the nasal cavity) can irritate the throat. It can also make the throat or tonsils more likely to be infected by bacteria. Severe, untreated tonsillitis in children or adults can cause a pocket of pus (abscess) to form near the tonsil.  Your evaluation  A medical evaluation can help find the cause of your sore throat. It can also help your healthcare provider choose the best treatment for you. The evaluation may include a health history, physical exam, and diagnostic tests.  Health history  Your healthcare provider may ask you the following:    How long has the sore throat lasted and how have you been treating it?    Do you have any other symptoms, such as body aches, fever, or cough?    Does your sore throat recur? If so, how often? How many days of school or work  "have you missed because of a sore throat?    Do you have trouble eating or swallowing?    Have you been told that you snore or have other sleep problems?    Do you have bad breath?    Do you cough up bad-tasting mucus?  Physical exam  During the exam, your healthcare provider checks your ears, nose, and throat for problems. He or she also checks for swelling in the neck, and may listen to your chest.  Possible tests  Other tests your healthcare provider may perform include:    A throat swab to check for bacteria such as streptococcus (the bacteria that causes strep throat)    A blood test to check for mononucleosis (a viral infection)    A chest X-ray to rule out pneumonia, especially if you have a cough  Treating a sore throat  Treatment depends on many factors. What is the likely cause? Is the problem recent? Does it keep coming back? In many cases, the best thing to do is to treat the symptoms, rest, and let the problem heal itself. Antibiotics may help clear up some bacterial infections. For cases of severe or recurring tonsillitis, the tonsils may need to be removed.  Relieving your symptoms    Don t smoke, and avoid secondhand smoke.    For children, try throat sprays or Popsicles. Adults and older children may try lozenges.    Drink warm liquids to soothe the throat and help thin mucus. Avoid alcohol, spicy foods, and acidic drinks such as orange juice. These can irritate the throat.    Gargle with warm saltwater (1 teaspoon of salt to 8 ounces of warm water).    Use a humidifier to keep air moist and relieve throat dryness.    Try over-the-counter pain relievers such as acetaminophen or ibuprofen. Use as directed, and don t exceed the recommended dose. Don t give aspirin to children.   Are antibiotics needed?  If your sore throat is due to a bacterial infection, antibiotics may speed healing and prevent complications. Although group A streptococcus (\"strep throat\" or GAS) is the major treatable infection for " a sore throat, GAS causes only 5% to 15% of sore throats in adults who seek medical care. Most sore throats are caused by cold or flu viruses. And antibiotics don t treat viral illness. In fact, using antibiotics when they re not needed may produce bacteria that are harder to kill. Your healthcare provider will prescribe antibiotics only if he or she thinks they are likely to help.  If antibiotics are prescribed  Take the medicine exactly as directed. Be sure to finish your prescription even if you re feeling better. And be sure to ask your healthcare provider or pharmacist what side effects are common and what to do about them.  Is surgery needed?  In some cases, tonsils need to be removed. This is often done as outpatient (same-day) surgery. Your healthcare provider may advise removing the tonsils in cases of:    Several severe bouts of tonsillitis in a year.  Severe  episodes include those that lead to missed days of school or work, or that need to be treated with antibiotics.    Tonsillitis that causes breathing problems during sleep    Tonsillitis caused by food particles collecting in pouches in the tonsils (cryptic tonsillitis)  Call your healthcare provider if any of the following occur:    Symptoms worsen, or new symptoms develop.    Swollen tonsils make breathing difficult.    The pain is severe enough to keep you from drinking liquids.    A skin rash, hives, or wheezing develops. Any of these could signal an allergic reaction to antibiotics.    Symptoms don t improve within a week.    Symptoms don t improve within 2 to 3 days of starting antibiotics.   Date Last Reviewed: 10/1/2016    [id number] [employer]. [address] [place] [address], [place] [address]. All rights reserved. This information is not intended as a substitute for professional medical care. Always follow your healthcare professional's instructions.                Follow-ups after your visit        Who to contact     If you have  "questions or need follow up information about today's clinic visit or your schedule please contact [facility] directly at [phone].  Normal or non-critical lab and imaging results will be communicated to you by MyChart, letter or phone within 4 business days after the clinic has received the results. If you do not hear from us within 7 days, please contact the clinic through [employer] or phone. If you have a critical or abnormal lab result, we will notify you by phone as soon as possible.  Submit refill requests through [employer] or call your pharmacy and they will forward the refill request to us. Please allow 3 business days for your refill to be completed.          Additional Information About Your Visit        [employer] Information     [employer] gives you secure access to your electronic health record. If you see a primary care provider, you can also send messages to your care team and make appointments. If you have questions, please call your primary care clinic.  If you do not have a primary care provider, please call [phone] and they will assist you.        Care EveryWhere ID     This is your Care EveryWhere ID. This could be used by other organizations to access your [place] medical records  [id number]        Your Vitals Were     Pulse Temperature Respirations Height BMI (Body Mass Index)       81 97.8  F (36.6  C) (Oral) 14 5' 5.5\" (1.664 m) 30.15 kg/m2        Blood Pressure from Last 3 Encounters:   04/04/18 106/72   11/21/17 111/72   11/09/17 112/62    Weight from Last 3 Encounters:   04/04/18 184 lb (83.5 kg)   11/21/17 180 lb (81.6 kg)   11/09/17 180 lb 8 oz (81.9 kg)              We Performed the Following     Rapid strep screen        Primary Care Provider Office Phone # Fax #    Susan Rachele Haase, APRN -[phone] [id number]  [facility] [id number]        Equal Access to Services     YAHAIRA RAMESH AH: rashaad Rich, " angel gorealbritt dockerymaru mishelcarola alejandro. So [facility] [phone].    ATENCIÓN: Si cullen farias, tiene a sal disposición servicios gratuitos de asistencia lingüística. Diane al [phone].    We comply with applicable federal civil rights laws and [place] laws. We do not discriminate on the basis of race, color, national origin, age, disability, sex, sexual orientation, or gender identity.            Thank you!     Thank you for choosing [facility]  for your care. Our goal is always to provide you with excellent care. Hearing back from our patients is one way we can continue to improve our services. Please take a few minutes to complete the written survey that you may receive in the mail after your visit with us. Thank you!             Your Updated Medication List - Protect others around you: Learn how to safely use, store and throw away your medicines at www.disposemymeds.org.          This list is accurate as of 4/4/18  7:30 AM.  Always use your most recent med list.                   Brand Name Dispense Instructions for use Diagnosis    breast pump Misc     1 each    1 each as needed    Normal first pregnancy in third trimester       loratadine 10 MG tablet    CLARITIN    30 tablet    Take 1 tablet (10 mg) by mouth daily    Upper respiratory tract infection, unspecified type       norethindrone 0.35 MG per tablet    MICRONOR    112 tablet    Take 1 tablet (0.35 mg) by mouth daily    Routine postpartum follow-up       PRENATAL COMPLETE 14-0.4 MG Tabs

## 2018-04-04 NOTE — NURSING NOTE
"Chief Complaint   Patient presents with     Throat Problem       Initial /72 (BP Location: Right arm, Patient Position: Chair, Cuff Size: Adult Regular)  Pulse 81  Temp 97.8  F (36.6  C) (Oral)  Resp 14  Ht 5' 5.5\" (1.664 m)  Wt 184 lb (83.5 kg)  BMI 30.15 kg/m2 Estimated body mass index is 30.15 kg/(m^2) as calculated from the following:    Height as of this encounter: 5' 5.5\" (1.664 m).    Weight as of this encounter: 184 lb (83.5 kg).  Medication Reconciliation: complete    "

## 2018-04-04 NOTE — PATIENT INSTRUCTIONS
When You Have a Sore Throat    A sore throat can be painful. There are many reasons why you may have a sore throat. Your healthcare provider will work with you to find the cause of your sore throat. He or she will also find the best treatment for you.  What causes a sore throat?  Sore throats can be caused or worsened by:    Cold or flu viruses    Bacteria    Irritants such as tobacco smoke or air pollution    Acid reflux  A healthy throat  The tonsils are on the sides of the throat near the base of the tongue. They collect viruses and bacteria and help fight infection. The throat (pharynx) is the passage for air. Mucus from the nasal cavity also moves down the passage.  An inflamed throat  The tonsils and pharynx can become inflamed due to a cold or flu virus. Postnasal drip (excess mucus draining from the nasal cavity) can irritate the throat. It can also make the throat or tonsils more likely to be infected by bacteria. Severe, untreated tonsillitis in children or adults can cause a pocket of pus (abscess) to form near the tonsil.  Your evaluation  A medical evaluation can help find the cause of your sore throat. It can also help your healthcare provider choose the best treatment for you. The evaluation may include a health history, physical exam, and diagnostic tests.  Health history  Your healthcare provider may ask you the following:    How long has the sore throat lasted and how have you been treating it?    Do you have any other symptoms, such as body aches, fever, or cough?    Does your sore throat recur? If so, how often? How many days of school or work have you missed because of a sore throat?    Do you have trouble eating or swallowing?    Have you been told that you snore or have other sleep problems?    Do you have bad breath?    Do you cough up bad-tasting mucus?  Physical exam  During the exam, your healthcare provider checks your ears, nose, and throat for problems. He or she also checks for  "swelling in the neck, and may listen to your chest.  Possible tests  Other tests your healthcare provider may perform include:    A throat swab to check for bacteria such as streptococcus (the bacteria that causes strep throat)    A blood test to check for mononucleosis (a viral infection)    A chest X-ray to rule out pneumonia, especially if you have a cough  Treating a sore throat  Treatment depends on many factors. What is the likely cause? Is the problem recent? Does it keep coming back? In many cases, the best thing to do is to treat the symptoms, rest, and let the problem heal itself. Antibiotics may help clear up some bacterial infections. For cases of severe or recurring tonsillitis, the tonsils may need to be removed.  Relieving your symptoms    Don t smoke, and avoid secondhand smoke.    For children, try throat sprays or Popsicles. Adults and older children may try lozenges.    Drink warm liquids to soothe the throat and help thin mucus. Avoid alcohol, spicy foods, and acidic drinks such as orange juice. These can irritate the throat.    Gargle with warm saltwater (1 teaspoon of salt to 8 ounces of warm water).    Use a humidifier to keep air moist and relieve throat dryness.    Try over-the-counter pain relievers such as acetaminophen or ibuprofen. Use as directed, and don t exceed the recommended dose. Don t give aspirin to children.   Are antibiotics needed?  If your sore throat is due to a bacterial infection, antibiotics may speed healing and prevent complications. Although group A streptococcus (\"strep throat\" or GAS) is the major treatable infection for a sore throat, GAS causes only 5% to 15% of sore throats in adults who seek medical care. Most sore throats are caused by cold or flu viruses. And antibiotics don t treat viral illness. In fact, using antibiotics when they re not needed may produce bacteria that are harder to kill. Your healthcare provider will prescribe antibiotics only if he or " she thinks they are likely to help.  If antibiotics are prescribed  Take the medicine exactly as directed. Be sure to finish your prescription even if you re feeling better. And be sure to ask your healthcare provider or pharmacist what side effects are common and what to do about them.  Is surgery needed?  In some cases, tonsils need to be removed. This is often done as outpatient (same-day) surgery. Your healthcare provider may advise removing the tonsils in cases of:    Several severe bouts of tonsillitis in a year.  Severe  episodes include those that lead to missed days of school or work, or that need to be treated with antibiotics.    Tonsillitis that causes breathing problems during sleep    Tonsillitis caused by food particles collecting in pouches in the tonsils (cryptic tonsillitis)  Call your healthcare provider if any of the following occur:    Symptoms worsen, or new symptoms develop.    Swollen tonsils make breathing difficult.    The pain is severe enough to keep you from drinking liquids.    A skin rash, hives, or wheezing develops. Any of these could signal an allergic reaction to antibiotics.    Symptoms don t improve within a week.    Symptoms don t improve within 2 to 3 days of starting antibiotics.   Date Last Reviewed: 10/1/2016    5132-2596 [employer]. [address] [place] [address], [place] [address]. All rights reserved. This information is not intended as a substitute for professional medical care. Always follow your healthcare professional's instructions.

## 2018-04-04 NOTE — TELEPHONE ENCOUNTER
Patient called to see what results were. I gave her the message below,she plans on  Rx shortly.  Breana Farias

## 2018-04-04 NOTE — PROGRESS NOTES
SUBJECTIVE:   Kendal Miranda is a 32 year old female who presents to clinic today for the following health issues:    Throat problem 1 week and worsening- pain (not a sore throat) in the back of the throat on the left side. Hurts after talking all day. Worsens as day goes on. No troubles swallowing. No change in voice. No increased drooling. No fever or chills. No other URI symptoms. No treatments tried.     Problem list and histories reviewed & adjusted, as indicated.  Additional history: as documented    Patient Active Problem List   Diagnosis     CARDIOVASCULAR SCREENING; LDL GOAL LESS THAN 160     High grade squamous intraepithelial dysplasia     S/P LEEP (loop electrosurgical excision procedure)     Anxiety     Pregnant state, incidental     Indication for care in labor or delivery     Encounter for triage in pregnant patient      (spontaneous vaginal delivery)     Past Surgical History:   Procedure Laterality Date     COLP,CX W/UP ADJ VAG,W/BX, CX  2013    MAXIMO 1. 11, 111     HC TOOTH EXTRACTION W/FORCEP  2006     LEEP TX, CERVICAL  2013       Social History   Substance Use Topics     Smoking status: Never Smoker     Smokeless tobacco: Never Used      Comment: non smoking home     Alcohol use 0.0 oz/week     0 Standard drinks or equivalent per week      Comment: spring break     Family History   Problem Relation Age of Onset     Hypertension Mother      Hypertension Maternal Grandmother      DIABETES No family hx of      CANCER No family hx of          Current Outpatient Prescriptions   Medication Sig Dispense Refill     amoxicillin (AMOXIL) 500 MG capsule Take 1 capsule (500 mg) by mouth 2 times daily for 10 days 20 capsule 0     norethindrone (MICRONOR) 0.35 MG per tablet Take 1 tablet (0.35 mg) by mouth daily 112 tablet 3     Misc. Devices (BREAST PUMP) MISC 1 each as needed 1 each 0     Prenatal Vit-Fe Fumarate-FA (PRENATAL COMPLETE) 14-0.4 MG TABS        loratadine (CLARITIN) 10 MG tablet Take 1  "tablet (10 mg) by mouth daily 30 tablet 1     Allergies   Allergen Reactions     Sulfa Drugs Hives     BP Readings from Last 3 Encounters:   04/04/18 106/72   11/21/17 111/72   11/09/17 112/62    Wt Readings from Last 3 Encounters:   04/04/18 184 lb (83.5 kg)   11/21/17 180 lb (81.6 kg)   11/09/17 180 lb 8 oz (81.9 kg)                    Reviewed and updated as needed this visit by clinical staff  Tobacco  Allergies  Meds  Problems  Med Hx  Surg Hx  Fam Hx  Soc Hx        Reviewed and updated as needed this visit by Provider  Allergies  Meds  Problems         ROS:  Constitutional, HEENT, cardiovascular, pulmonary, gi and gu systems are negative, except as otherwise noted.    OBJECTIVE:     /72 (BP Location: Right arm, Patient Position: Chair, Cuff Size: Adult Regular)  Pulse 81  Temp 97.8  F (36.6  C) (Oral)  Resp 14  Ht 5' 5.5\" (1.664 m)  Wt 184 lb (83.5 kg)  BMI 30.15 kg/m2  Body mass index is 30.15 kg/(m^2).  GENERAL: healthy, alert and no distress  EYES: Eyes grossly normal to inspection, PERRL and conjunctivae and sclerae normal  HENT: normal cephalic/atraumatic, both ears: clear effusion, nasal mucosa edematous , oropharynx clear, oral mucous membranes moist, tonsillar hypertrophy, tonsillar erythema and sinuses: not tender  NECK: no adenopathy, no asymmetry, masses, or scars and thyroid normal to palpation  SKIN: no suspicious lesions or rashes  PSYCH: mentation appears normal, affect normal/bright    Diagnostic Test Results:  Results for orders placed or performed in visit on 04/04/18 (from the past 24 hour(s))   Rapid strep screen   Result Value Ref Range    Specimen Description Throat     Rapid Strep A Screen (A)      POSITIVE: Group A Streptococcal antigen detected by immunoassay.       ASSESSMENT/PLAN:     (J02.0) Strep pharyngitis  (primary encounter diagnosis)  Comment: evident on rapid strep. abx to be used. No evidence of abscess on exam. If no improvement in 3-5 days, patient " should RTC. Sooner if worsening.   Plan: Rapid strep screen, amoxicillin (AMOXIL) 500 MG        capsule        -Medication use and side effects discussed with the patient. Patient is in complete understanding and agreement with plan.         Follow up: as above     Mac Motley PA-C  [facility]

## 2018-04-18 ENCOUNTER — OFFICE VISIT (OUTPATIENT)
Dept: FAMILY MEDICINE | Facility: CLINIC | Age: 33
End: 2018-04-18
Payer: COMMERCIAL

## 2018-04-18 VITALS
BODY MASS INDEX: 30.32 KG/M2 | SYSTOLIC BLOOD PRESSURE: 109 MMHG | WEIGHT: 185 LBS | HEART RATE: 79 BPM | DIASTOLIC BLOOD PRESSURE: 74 MMHG | RESPIRATION RATE: 14 BRPM | TEMPERATURE: 97.5 F

## 2018-04-18 DIAGNOSIS — N89.8 VAGINAL DISCHARGE: Primary | ICD-10-CM

## 2018-04-18 LAB
SPECIMEN SOURCE: NORMAL
WET PREP SPEC: NORMAL

## 2018-04-18 PROCEDURE — 99213 OFFICE O/P EST LOW 20 MIN: CPT | Performed by: PHYSICIAN ASSISTANT

## 2018-04-18 PROCEDURE — 87210 SMEAR WET MOUNT SALINE/INK: CPT | Performed by: PHYSICIAN ASSISTANT

## 2018-04-18 RX ORDER — FLUCONAZOLE 150 MG/1
150 TABLET ORAL
Qty: 4 TABLET | Refills: 0 | Status: ON HOLD | OUTPATIENT
Start: 2018-04-18 | End: 2020-02-21

## 2018-04-18 NOTE — MR AVS SNAPSHOT
After Visit Summary   4/18/2018    Kendal Miranda    MRN: [id number]           Patient Information     Date Of Birth          1985        Visit Information        Provider Department      4/18/2018 11:45 AM Ct Shanks PA-C [facility]        Today's Diagnoses     Vaginal discharge    -  1       Follow-ups after your visit        Who to contact     If you have questions or need follow up information about today's clinic visit or your schedule please contact [facility] directly at [phone].  Normal or non-critical lab and imaging results will be communicated to you by MyChart, letter or phone within 4 business days after the clinic has received the results. If you do not hear from us within 7 days, please contact the clinic through Verafint or phone. If you have a critical or abnormal lab result, we will notify you by phone as soon as possible.  Submit refill requests through Breezy or call your pharmacy and they will forward the refill request to us. Please allow 3 business days for your refill to be completed.          Additional Information About Your Visit        MyChart Information     [employer] gives you secure access to your electronic health record. If you see a primary care provider, you can also send messages to your care team and make appointments. If you have questions, please call your primary care clinic.  If you do not have a primary care provider, please call [phone] and they will assist you.        Care EveryWhere ID     This is your Care EveryWhere ID. This could be used by other organizations to access your [place] medical records  [id number]        Your Vitals Were     Pulse Temperature Respirations BMI (Body Mass Index)          79 97.5  F (36.4  C) (Oral) 14 30.32 kg/m2         Blood Pressure from Last 3 Encounters:   04/18/18 109/74   04/04/18 106/72   11/21/17 111/72    Weight from Last 3 Encounters:   04/18/18 185 lb  (83.9 kg)   04/04/18 184 lb (83.5 kg)   11/21/17 180 lb (81.6 kg)              We Performed the Following     Wet prep        Primary Care Provider Office Phone # Fax #    Susan Rachele Haase, APRN [phone] [address] [facility] [address]        Equal Access to Services     KVNG RAMESH : Hadii aad ku hadasho Soomaali, waaxda luqadaha, qaybta kaalmada adeegyada, waxay idiin hayaan adeeg khrashmish la'aan ah. So [facility] [phone].    ATENCIÓN: Si habla español, tiene a sal disposición servicios gratuitos de asistencia lingüística. Melame al [phone].    We comply with applicable federal civil rights laws and [place] laws. We do not discriminate on the basis of race, color, national origin, age, disability, sex, sexual orientation, or gender identity.            Thank you!     Thank you for choosing [facility]  for your care. Our goal is always to provide you with excellent care. Hearing back from our patients is one way we can continue to improve our services. Please take a few minutes to complete the written survey that you may receive in the mail after your visit with us. Thank you!             Your Updated Medication List - Protect others around you: Learn how to safely use, store and throw away your medicines at www.disposemymeds.org.          This list is accurate as of 4/18/18 11:54 AM.  Always use your most recent med list.                   Brand Name Dispense Instructions for use Diagnosis    breast pump Misc     1 each    1 each as needed    Normal first pregnancy in third trimester       loratadine 10 MG tablet    CLARITIN    30 tablet    Take 1 tablet (10 mg) by mouth daily    Upper respiratory tract infection, unspecified type       norethindrone 0.35 MG per tablet    MICRONOR    112 tablet    Take 1 tablet (0.35 mg) by mouth daily    Routine postpartum follow-up       PRENATAL COMPLETE 14-0.4 MG Tabs

## 2018-04-18 NOTE — PROGRESS NOTES
SUBJECTIVE:   Kendal Miranda is a 32 year old female who presents to clinic today for the following health issues:      Vaginal Symptoms  Onset: X 4 days     Description:  Vaginal Discharge: creamy   Itching (Pruritis): YES  Burning sensation:  YES  Odor: no     Accompanying Signs & Symptoms:  Pain with Urination: YES  Abdominal Pain: no   Fever: no     History:   Sexually active: YES  New Partner: no   Possibility of Pregnancy:  No    Precipitating factors:   Recent Antibiotic Use: YES- Amoxicillin     Alleviating factors:  none    Therapies Tried and outcome: tried monistat 3 day, no help with itching        Problem list and histories reviewed & adjusted, as indicated.  Additional history: as documented    Patient Active Problem List   Diagnosis     CARDIOVASCULAR SCREENING; LDL GOAL LESS THAN 160     High grade squamous intraepithelial dysplasia     S/P LEEP (loop electrosurgical excision procedure)     Anxiety     Pregnant state, incidental     Indication for care in labor or delivery     Encounter for triage in pregnant patient      (spontaneous vaginal delivery)     Past Surgical History:   Procedure Laterality Date     COLP,CX W/UP ADJ VAG,W/BX, CX  2013    MAXIMO 1. 11, 111     HC TOOTH EXTRACTION W/FORCEP  2006     LEEP TX, CERVICAL  2013       Social History   Substance Use Topics     Smoking status: Never Smoker     Smokeless tobacco: Never Used      Comment: non smoking home     Alcohol use No      Comment: breast feeding      Family History   Problem Relation Age of Onset     Hypertension Mother      Hypertension Maternal Grandmother      DIABETES No family hx of      CANCER No family hx of            Reviewed and updated as needed this visit by clinical staff  Tobacco  Allergies  Meds  Med Hx  Surg Hx  Fam Hx  Soc Hx      Reviewed and updated as needed this visit by Provider         ROS:  Constitutional, HEENT, cardiovascular, pulmonary, gi and gu systems are negative, except as otherwise  noted.    OBJECTIVE:     /74 (BP Location: Right arm, Patient Position: Chair, Cuff Size: Adult Regular)  Pulse 79  Temp 97.5  F (36.4  C) (Oral)  Resp 14  Wt 185 lb (83.9 kg)  LMP   BMI 30.32 kg/m2  Body mass index is 30.32 kg/(m^2).  GENERAL APPEARANCE: healthy, alert and no distress  CV: regular rates and rhythm, normal S1 S2, no S3 or S4 and no murmur, click or rub  ABDOMEN: soft, nontender, without hepatosplenomegaly or masses and bowel sounds normal    Diagnostic Test Results:  Results for orders placed or performed in visit on 04/18/18 (from the past 24 hour(s))   Wet prep   Result Value Ref Range    Specimen Description Vagina     Wet Prep No Trichomonas seen     Wet Prep No clue cells seen     Wet Prep No yeast seen        ASSESSMENT/PLAN:             1. Vaginal discharge  Will treat given symptoms. F/u if symptoms persist  - Wet prep  - fluconazole (DIFLUCAN) 150 MG tablet; Take 1 tablet (150 mg) by mouth every 3 days  Dispense: 4 tablet; Refill: 0        Ct Shanks PA-C  [facility]

## 2018-11-09 ENCOUNTER — ALLIED HEALTH/NURSE VISIT (OUTPATIENT)
Dept: NURSING | Facility: CLINIC | Age: 33
End: 2018-11-09
Payer: COMMERCIAL

## 2018-11-09 DIAGNOSIS — Z23 NEED FOR PROPHYLACTIC VACCINATION AND INOCULATION AGAINST INFLUENZA: Primary | ICD-10-CM

## 2018-11-09 PROCEDURE — 90471 IMMUNIZATION ADMIN: CPT

## 2018-11-09 PROCEDURE — 90686 IIV4 VACC NO PRSV 0.5 ML IM: CPT

## 2018-11-09 NOTE — MR AVS SNAPSHOT
After Visit Summary   11/9/2018    Kendal Miranda    MRN: [id number]           Patient Information     Date Of Birth          1985        Visit Information        Provider Department      11/9/2018 9:00 AM GANESH LÓPEZ/LPN [facility]        Today's Diagnoses     Need for prophylactic vaccination and inoculation against influenza    -  1       Follow-ups after your visit        Who to contact     If you have questions or need follow up information about today's clinic visit or your schedule please contact [facility] directly at [phone].  Normal or non-critical lab and imaging results will be communicated to you by AMVONEThart, letter or phone within 4 business days after the clinic has received the results. If you do not hear from us within 7 days, please contact the clinic through Radio Rebelt or phone. If you have a critical or abnormal lab result, we will notify you by phone as soon as possible.  Submit refill requests through [employer] or call your pharmacy and they will forward the refill request to us. Please allow 3 business days for your refill to be completed.          Additional Information About Your Visit        MyChart Information     [employer] gives you secure access to your electronic health record. If you see a primary care provider, you can also send messages to your care team and make appointments. If you have questions, please call your primary care clinic.  If you do not have a primary care provider, please call [phone] and they will assist you.        Care EveryWhere ID     This is your Care EveryWhere ID. This could be used by other organizations to access your [place] medical records  [id number]         Blood Pressure from Last 3 Encounters:   04/18/18 109/74   04/04/18 106/72   11/21/17 111/72    Weight from Last 3 Encounters:   04/18/18 185 lb (83.9 kg)   04/04/18 184 lb (83.5 kg)   11/21/17 180 lb (81.6 kg)              We Performed the  Following     FLU VACCINE, SPLIT VIRUS, IM (QUADRIVALENT) [24329]- >3 YRS     Vaccine Administration, Initial [56285]        Primary Care Provider Office Phone # Fax #    Susan Rachele Haase, APRN [phone] [address] [facility] [address]        Equal Access to Services     YAHAIRA RAMESH : Hadii aad ku hadasho Soomaali, waaxda luqadaha, qaybta kaalmada adeegyada, waxay mishelin hayaan adechris khrashmireg singh . So [facility] [phone].    ATENCIÓN: Si habla español, tiene a sal disposición servicios gratuitos de asistencia lingüística. Diane al [phone].    We comply with applicable federal civil rights laws and [place] laws. We do not discriminate on the basis of race, color, national origin, age, disability, sex, sexual orientation, or gender identity.            Thank you!     Thank you for choosing [facility]  for your care. Our goal is always to provide you with excellent care. Hearing back from our patients is one way we can continue to improve our services. Please take a few minutes to complete the written survey that you may receive in the mail after your visit with us. Thank you!             Your Updated Medication List - Protect others around you: Learn how to safely use, store and throw away your medicines at www.disposemymeds.org.          This list is accurate as of 11/9/18  9:22 AM.  Always use your most recent med list.                   Brand Name Dispense Instructions for use Diagnosis    breast pump Misc     1 each    1 each as needed    Normal first pregnancy in third trimester       fluconazole 150 MG tablet    DIFLUCAN    4 tablet    Take 1 tablet (150 mg) by mouth every 3 days    Vaginal discharge       loratadine 10 MG tablet    CLARITIN    30 tablet    Take 1 tablet (10 mg) by mouth daily    Upper respiratory tract infection, unspecified type       norethindrone 0.35 MG per tablet    MICRONOR    112 tablet    Take 1 tablet (0.35 mg) by mouth  daily    Routine postpartum follow-up       PRENATAL COMPLETE 14-0.4 MG Tabs

## 2018-12-21 ENCOUNTER — OFFICE VISIT (OUTPATIENT)
Dept: URGENT CARE | Facility: URGENT CARE | Age: 33
End: 2018-12-21
Payer: COMMERCIAL

## 2018-12-21 VITALS
DIASTOLIC BLOOD PRESSURE: 72 MMHG | OXYGEN SATURATION: 98 % | SYSTOLIC BLOOD PRESSURE: 102 MMHG | TEMPERATURE: 98 F | HEART RATE: 69 BPM

## 2018-12-21 DIAGNOSIS — J01.90 ACUTE SINUSITIS WITH SYMPTOMS > 10 DAYS: Primary | ICD-10-CM

## 2018-12-21 PROCEDURE — 99214 OFFICE O/P EST MOD 30 MIN: CPT | Performed by: FAMILY MEDICINE

## 2018-12-21 RX ORDER — AMOXICILLIN 875 MG
875 TABLET ORAL 2 TIMES DAILY
Qty: 20 TABLET | Refills: 0 | Status: SHIPPED | OUTPATIENT
Start: 2018-12-21 | End: 2018-12-31

## 2018-12-21 RX ORDER — FLUTICASONE PROPIONATE 50 MCG
2 SPRAY, SUSPENSION (ML) NASAL DAILY
Qty: 1 BOTTLE | Refills: 1 | Status: ON HOLD | OUTPATIENT
Start: 2018-12-21 | End: 2020-02-21

## 2018-12-21 NOTE — PROGRESS NOTES
SUBJECTIVE:   Kendal Miranda is a 33 year old female presenting with a chief complaint of two weeks of sore throat (mild), stuffy nose, body aches (all over), bilateral ear pain (inside the ears. ).  Onset of symptoms was two weeks ago.  Patient is flying to [facility], on [place] Day, and she needs to get better before she flies.    Course of illness is worsening. .    Severity severe.   Current and Associated symptoms: as listed above.   Treatment measures tried include Mucinex, Sudafed, Dayquil, Nyquil.  .  Predisposing factors include her child recently ear infection and bronchitis. .    Past Medical History:   Diagnosis Date     ASCUS on Pap smear 11/10/14    Neg HPV     High grade squamous intraepithelial lesion of cervix 1/13     LSIL (low grade squamous intraepithelial lesion) on Pap smear      S/P LEEP of cervix 7/12/13    pt lives in [facility]     Current Outpatient Medications   Medication Sig Dispense Refill     fluconazole (DIFLUCAN) 150 MG tablet Take 1 tablet (150 mg) by mouth every 3 days 4 tablet 0     loratadine (CLARITIN) 10 MG tablet Take 1 tablet (10 mg) by mouth daily 30 tablet 1     Misc. Devices (BREAST PUMP) MISC 1 each as needed 1 each 0     norethindrone (MICRONOR) 0.35 MG per tablet Take 1 tablet (0.35 mg) by mouth daily 112 tablet 3     Prenatal Vit-Fe Fumarate-FA (PRENATAL COMPLETE) 14-0.4 MG TABS        Social History     Tobacco Use     Smoking status: Never Smoker     Smokeless tobacco: Never Used     Tobacco comment: non smoking home   Substance Use Topics     Alcohol use: No     Alcohol/week: 0.0 oz     Comment: breast feeding        ROS:  CONSTITUTIONAL:NEGATIVE for fever, chills, change in weight  ENT/MOUTH: POSITIVE for stuffy nose, sore throat.   RESP:POSITIVE for mouth breathing.   MUSCULOSKELETAL:  Positive for body aches.     OBJECTIVE:  /72 (BP Location: Right arm, Patient Position: Chair, Cuff Size: Adult Regular)   Pulse 69   Temp 98  F (36.7   C) (Tympanic)   SpO2 98%   GENERAL APPEARANCE: healthy, alert and no distress.  Voice sounds as if the nose is congested.    HENT: TM's normal bilaterally, nasal turbinates erythematous, swollen and oropharynx is mildly erythematous without exudates.   NECK: supple, nontender, no lymphadenopathy  RESP: lungs clear to auscultation - no rales, rhonchi or wheezes  CV: regular rates and rhythm, normal S1 S2, no murmur noted  SKIN: no suspicious lesions or rashes    ASSESSMENT:  Sinusitis (Acute)    PLAN:  Rx:  Amoxicillin, Flonase  Saline nasal rinses  Drink plenty of liquids  Humidifier  follow up with the primary care provider if not better in 10-14 days.   See orders in Epic    Akhil Gracia MD

## 2018-12-21 NOTE — PATIENT INSTRUCTIONS
Drink plenty of liquids    Saline nasal rinses    Humidifier for the nasal passages    Afrin nasal spray for a maximum of three days to relieve some of the nasal congestion    follow up with your primary care provider if not better in 10-14 days.

## 2019-01-10 RX ORDER — ACETAMINOPHEN AND CODEINE PHOSPHATE 120; 12 MG/5ML; MG/5ML
SOLUTION ORAL DAILY
Qty: 112 TABLET | Refills: 0 | Status: ON HOLD | OUTPATIENT
Start: 2019-01-10 | End: 2020-02-21

## 2019-01-10 NOTE — TELEPHONE ENCOUNTER
"Requested Prescriptions   Pending Prescriptions Disp Refills     norethindrone (MICRONOR) 0.35 MG tablet [Pharmacy Med Name: NORETHINDRONE 0.35 MG TABLET] 112 tablet 2     Sig: TAKE 1 TABLET (0.35 MG) BY MOUTH DAILY    Contraceptives Protocol Failed - 1/10/2019  2:16 AM       Failed - Recent (12 mo) or future (30 days) visit within the authorizing provider's specialty    Patient had office visit in the last 12 months or has a visit in the next 30 days with authorizing provider or within the authorizing provider's specialty.  See \"Patient Info\" tab in inbasket, or \"Choose Columns\" in Meds & Orders section of the refill encounter.             Passed - Patient is not a current smoker if age is 35 or older       Passed - Medication is active on med list       Passed - No active pregnancy on record       Passed - No positive pregnancy test in past 12 months        3 months of rx given. Letter and mychart message sent to the pt informing her of the need for an appt.      Isabell Martines RN    "

## 2019-01-10 NOTE — LETTER
[facility]  [address], [address]  [place] [address]  [phone]        January 10, 2019    Kendal Miranda  [address] [facility] [address]            Dear Ms. Kendal Miranda:      We recently received a request from your pharmacy requesting a refill of your birth control pills.    A review of your chart indicates that an appointment is required.  Please contact our office at [phone] to schedule an office visit.    You will need this appointment for future refills on your medication. Your medication was refilled for 3 month (s).    Taking care of your health is important to us and ongoing visits with your provider are vital to your care.  We look forward to seeing you in the near future.        Sincerely,      Naa Ashley MD

## 2019-04-27 ENCOUNTER — APPOINTMENT (OUTPATIENT)
Dept: ULTRASOUND IMAGING | Facility: CLINIC | Age: 34
End: 2019-04-27
Attending: EMERGENCY MEDICINE
Payer: COMMERCIAL

## 2019-04-27 ENCOUNTER — HOSPITAL ENCOUNTER (EMERGENCY)
Facility: CLINIC | Age: 34
Discharge: HOME | End: 2019-04-27
Attending: EMERGENCY MEDICINE | Admitting: EMERGENCY MEDICINE
Payer: COMMERCIAL

## 2019-04-27 VITALS
SYSTOLIC BLOOD PRESSURE: 117 MMHG | DIASTOLIC BLOOD PRESSURE: 84 MMHG | OXYGEN SATURATION: 100 % | TEMPERATURE: 98.5 F | BODY MASS INDEX: 27.86 KG/M2 | HEART RATE: 77 BPM | WEIGHT: 170 LBS | RESPIRATION RATE: 18 BRPM

## 2019-04-27 DIAGNOSIS — O03.9 SPONTANEOUS ABORTION: ICD-10-CM

## 2019-04-27 LAB
ALBUMIN UR-MCNC: NEGATIVE MG/DL
APPEARANCE UR: CLEAR
B-HCG SERPL-ACNC: 6 IU/L (ref 0–5)
BASOPHILS # BLD AUTO: 0 10E9/L (ref 0–0.2)
BASOPHILS NFR BLD AUTO: 0.3 %
BILIRUB UR QL STRIP: NEGATIVE
COLOR UR AUTO: ABNORMAL
DIFFERENTIAL METHOD BLD: NORMAL
EOSINOPHIL # BLD AUTO: 0.1 10E9/L (ref 0–0.7)
EOSINOPHIL NFR BLD AUTO: 2 %
ERYTHROCYTE [DISTWIDTH] IN BLOOD BY AUTOMATED COUNT: 12.1 % (ref 10–15)
GLUCOSE UR STRIP-MCNC: NEGATIVE MG/DL
HCT VFR BLD AUTO: 41.5 % (ref 35–47)
HGB BLD-MCNC: 14.1 G/DL (ref 11.7–15.7)
HGB UR QL STRIP: ABNORMAL
IMM GRANULOCYTES # BLD: 0 10E9/L (ref 0–0.4)
IMM GRANULOCYTES NFR BLD: 0.3 %
KETONES UR STRIP-MCNC: NEGATIVE MG/DL
LEUKOCYTE ESTERASE UR QL STRIP: NEGATIVE
LYMPHOCYTES # BLD AUTO: 2 10E9/L (ref 0.8–5.3)
LYMPHOCYTES NFR BLD AUTO: 28 %
MCH RBC QN AUTO: 31.5 PG (ref 26.5–33)
MCHC RBC AUTO-ENTMCNC: 34 G/DL (ref 31.5–36.5)
MCV RBC AUTO: 93 FL (ref 78–100)
MONOCYTES # BLD AUTO: 0.6 10E9/L (ref 0–1.3)
MONOCYTES NFR BLD AUTO: 9 %
NEUTROPHILS # BLD AUTO: 4.3 10E9/L (ref 1.6–8.3)
NEUTROPHILS NFR BLD AUTO: 60.4 %
NITRATE UR QL: NEGATIVE
NRBC # BLD AUTO: 0 10*3/UL
NRBC BLD AUTO-RTO: 0 /100
PH UR STRIP: 5.5 PH (ref 5–7)
PLATELET # BLD AUTO: 258 10E9/L (ref 150–450)
RBC # BLD AUTO: 4.48 10E12/L (ref 3.8–5.2)
RBC #/AREA URNS AUTO: 2 /HPF (ref 0–2)
SOURCE: ABNORMAL
SP GR UR STRIP: 1.02 (ref 1–1.03)
UROBILINOGEN UR STRIP-MCNC: NORMAL MG/DL (ref 0–2)
WBC # BLD AUTO: 7.1 10E9/L (ref 4–11)
WBC #/AREA URNS AUTO: 1 /HPF (ref 0–5)

## 2019-04-27 PROCEDURE — 76801 OB US < 14 WKS SINGLE FETUS: CPT

## 2019-04-27 PROCEDURE — 84702 CHORIONIC GONADOTROPIN TEST: CPT | Performed by: EMERGENCY MEDICINE

## 2019-04-27 PROCEDURE — 85025 COMPLETE CBC W/AUTO DIFF WBC: CPT | Performed by: EMERGENCY MEDICINE

## 2019-04-27 PROCEDURE — 81001 URINALYSIS AUTO W/SCOPE: CPT | Performed by: EMERGENCY MEDICINE

## 2019-04-27 PROCEDURE — 99284 EMERGENCY DEPT VISIT MOD MDM: CPT | Mod: 25

## 2019-04-27 ASSESSMENT — ENCOUNTER SYMPTOMS
DIARRHEA: 0
VOMITING: 0
FEVER: 0
NAUSEA: 0

## 2019-04-27 NOTE — ED AVS SNAPSHOT
[facility] Emergency Department  [address]  [facility] [address]  Phone:  [phone]  Fax:  [phone]                                    Kendal Miranda   MRN: [id number]    Department:  [facility] Emergency Department   Date of Visit:  4/27/2019           After Visit Summary Signature Page    I have received my discharge instructions, and my questions have been answered. I have discussed any challenges I see with this plan with the nurse or doctor.    ..........................................................................................................................................  Patient/Patient Representative Signature      ..........................................................................................................................................  Patient Representative Print Name and Relationship to Patient    ..................................................               ................................................  Date                                   Time    ..........................................................................................................................................  Reviewed by Signature/Title    ...................................................              ..............................................  Date                                               Time          22EPIC Rev 08/18

## 2019-04-27 NOTE — ED TRIAGE NOTES
Vaginal bleeding and cramping started apporx 1 hr ago, A/ox 3, ABC's intact. Apporx 5 wks pregnant.

## 2019-04-27 NOTE — ED PROVIDER NOTES
History     Chief Complaint:  Vaginal Bleeding    HPI   Kendal Miranda is a 33 year old female,  at 5 weeks gestation, who presents with her  to the emergency department for evaluation of vaginal bleeding. The patient reports she is approximately 5 weeks gestation, which she found out four days prior to evaluation via home pregnancy test, and started experiencing vaginal bleeding and pelvic cramping at about noon today. She reports the vaginal bleeding is bright red and does have clots. She denies any nausea, vomiting, diarrhea, or fever.    Allergies:  Sulfa drugs: hives     Medications:    Fluconazole  Flonase    Past Medical History:    ASCUS on pap smear  High grade squamous intraepithelial lesion of cervix  LSIL  Anxiety    Past Surgical History:    Tooth extraction  LEEP    Family History:    HTN    Social History:  Smoking status: Never  Alcohol use: No  The patient presents to the emergency department with her .  Marital Status:   [2]     Review of Systems   Constitutional: Negative for fever.   Gastrointestinal: Negative for diarrhea, nausea and vomiting.   Genitourinary: Positive for pelvic pain and vaginal bleeding.   All other systems reviewed and are negative.        Physical Exam   Patient Vitals for the past 24 hrs:   BP Temp Temp src Pulse Heart Rate Resp SpO2 Weight   19 1451 132/89 98.5  F (36.9  C) Temporal -- 86 18 99 % 77.1 kg (170 lb)     Physical Exam  Constitutional: Patient is well appearing. No distress.  Head: Atraumatic.  Mouth/Throat: Oropharynx is clear and moist. No oropharyngeal exudate.  Eyes: Conjunctivae and EOM are normal. No scleral icterus.  Neck: Normal range of motion. Neck supple.   Cardiovascular: Normal rate, regular rhythm, normal heart sounds and intact distal pulses.   Pulmonary/Chest: Breath sounds normal. No respiratory distress.  Abdominal: Soft. Bowel sounds are normal. No distension. No tenderness. No rebound or guarding.    Musculoskeletal: Normal range of motion. No edema or tenderness.   Neurological: Alert and orientated to person, place, and time. No observable focal neuro deficit  Skin: Warm and dry. No rash noted. Not diaphoretic.     Emergency Department Course   Imaging:  Radiographic findings were communicated with the patient and family who voiced understanding of the findings.    US OB <14 Weeks w Transvaginal  IMPRESSION:    No convincing evidence of intrauterine products of  conception. Differential includes spontaneous  and normal  pregnancy with incorrect dates. Ectopic pregnancy is not excluded.  Followup as clinically indicated. No evidence for hemorrhage.  As read by Radiology.    Laboratory:  UA: Blood (Small) o/w Negative    CBC: AWNL (WBC 7.1, HGB 14.1, )  HCG blood: 6 (H)    Emergency Department Course:  Past medical records, nursing notes, and vitals reviewed.  1532: I performed an exam of the patient and obtained history, as documented above.     IV inserted and blood drawn.    The patient was sent for a transvaginal ultrasound while in the emergency department, findings above.     1629: I rechecked the patient. Explained findings to the patient and her .    Findings and plan explained to the Patient and spouse. Patient discharged home with instructions regarding supportive care, medications, and reasons to return. The importance of close follow-up was reviewed.   Impression & Plan    Medical Decision Making:  This  at 5 weeks by LMP presents with vaginal bleeding and pelvic cramping. The differential diagnosis included but was not limited to spontaneous  complete, incomplete, threatened, ectopic pregnancy. ED evaluation is as noted above. The patient remained hemodynamically stable. US revealed no evidence of intrauterine products. Her hemoglobin was 14.1. ED evaluation suggests likely spontaneous . Bleeding has slowed and the patient has remained hemodynamically  stable.     All questions and concerns were answered. The patient was discharged home and recommended to follow up with her primary physician and return with any new or worsening symptoms.     Diagnosis:    ICD-10-CM   1. Spontaneous  O03.9     Disposition:  Discharged to home with instructions for follow up.  Valentín Cook  2019   [facility] EMERGENCY DEPARTMENT  Scribe Disclosure:  I, Valentín Cook, am serving as a scribe at 3:32 PM on 2019 to document services personally performed by Sean Taylor MD based on my observations and the provider's statements to me.      Sean Taylor MD  19

## 2019-04-29 ENCOUNTER — TELEPHONE (OUTPATIENT)
Dept: OBGYN | Facility: CLINIC | Age: 34
End: 2019-04-29

## 2019-04-29 NOTE — TELEPHONE ENCOUNTER
Patient calling to report she had a miscarriage this weekend, needs appt.; please call pt. To discuss f/u appt. With Dr. Ashley.

## 2019-04-29 NOTE — TELEPHONE ENCOUNTER
Spoke with pt.    Pt still having some bleeding, not heavy.  Cramping as well.    Lab Results   Component Value Date    ABO O 09/22/2017    RH Pos 09/22/2017     She already scheduled appt with brian for Wed.    Vika SULLIVAN R.N.  [facility]

## 2019-05-01 ENCOUNTER — OFFICE VISIT (OUTPATIENT)
Dept: OBGYN | Facility: CLINIC | Age: 34
End: 2019-05-01
Payer: COMMERCIAL

## 2019-05-01 VITALS
WEIGHT: 173 LBS | BODY MASS INDEX: 27.8 KG/M2 | HEIGHT: 66 IN | SYSTOLIC BLOOD PRESSURE: 120 MMHG | DIASTOLIC BLOOD PRESSURE: 62 MMHG

## 2019-05-01 DIAGNOSIS — O03.9 SAB (SPONTANEOUS ABORTION): Primary | ICD-10-CM

## 2019-05-01 LAB — B-HCG SERPL-ACNC: <1 IU/L (ref 0–5)

## 2019-05-01 PROCEDURE — 84702 CHORIONIC GONADOTROPIN TEST: CPT | Performed by: ADVANCED PRACTICE MIDWIFE

## 2019-05-01 PROCEDURE — 99213 OFFICE O/P EST LOW 20 MIN: CPT | Performed by: ADVANCED PRACTICE MIDWIFE

## 2019-05-01 PROCEDURE — 36415 COLL VENOUS BLD VENIPUNCTURE: CPT | Performed by: ADVANCED PRACTICE MIDWIFE

## 2019-05-01 ASSESSMENT — MIFFLIN-ST. JEOR: SCORE: 1516.47

## 2019-05-01 NOTE — NURSING NOTE
"Chief Complaint   Patient presents with     Follow Up     miscarage       Initial /62   Ht 1.676 m (5' 6\")   Wt 78.5 kg (173 lb)   LMP 2019 (Approximate)   BMI 27.92 kg/m   Estimated body mass index is 27.92 kg/m  as calculated from the following:    Height as of this encounter: 1.676 m (5' 6\").    Weight as of this encounter: 78.5 kg (173 lb).  BP completed using cuff size: regular    Questioned patient about current smoking habits.  Pt. has never smoked.          The following HM Due: NONE    Franki Rasheed MA      "

## 2019-05-01 NOTE — PROGRESS NOTES
Kendal Miranda is a 33 year old female  at 5 weeks gestation by LMP. She had a positive UPT on 2019. She was seen in the ED on 2019 due to heavy vaginal bleeding, cramping, and clots. The bleeding increased on  and has since tapered off to a very light spotting. Ultrasound performed in ED on 2019 showed no intrauterine products of conception. HCG quant was done and follow up recommended in clinic.   Patient is currently having very light spotting  Patient is not cramping/having abdominal pain.  Patient is seen here with her .   Discussed that due to ultrasound, HCG and symptoms, she likely experienced an SAB. Will plan a repeat HCG quant today to confirm.  Reassured her that the loss could not have been stopped by her actions.    Patient Active Problem List   Diagnosis     CARDIOVASCULAR SCREENING; LDL GOAL LESS THAN 160     High grade squamous intraepithelial dysplasia     S/P LEEP (loop electrosurgical excision procedure)     Anxiety     Pregnant state, incidental     Indication for care in labor or delivery     Encounter for triage in pregnant patient      (spontaneous vaginal delivery)     Past Medical History:   Diagnosis Date     ASCUS on Pap smear 11/10/14    Neg HPV     High grade squamous intraepithelial lesion of cervix      LSIL (low grade squamous intraepithelial lesion) on Pap smear      S/P LEEP of cervix 13    pt lives in [facility]     Past Surgical History:   Procedure Laterality Date     COLP,CX W/UP ADJ VAG,W/BX, CX  2013    MAXIMO 1. 11, 111     HC TOOTH EXTRACTION W/FORCEP  2006     LEEP TX, CERVICAL       Current Outpatient Medications   Medication Sig Dispense Refill     Prenatal Vit-Fe Fumarate-FA (PRENATAL COMPLETE) 14-0.4 MG TABS        fluconazole (DIFLUCAN) 150 MG tablet Take 1 tablet (150 mg) by mouth every 3 days (Patient not taking: Reported on 2019) 4 tablet 0     fluticasone (FLONASE) 50 MCG/ACT nasal spray Spray 2 sprays into both  "nostrils daily (Patient not taking: Reported on 2019) 1 Bottle 1     loratadine (CLARITIN) 10 MG tablet Take 1 tablet (10 mg) by mouth daily 30 tablet 1     Misc. Devices (BREAST PUMP) MISC 1 each as needed 1 each 0     norethindrone (MICRONOR) 0.35 MG tablet TAKE 1 TABLET (0.35 MG) BY MOUTH DAILY (Patient not taking: Reported on 2019) 112 tablet 0     Allergies   Allergen Reactions     Sulfa Drugs Hives       Health maintenance updated:  no    OBJECTIVE:      /62   Ht 1.676 m (5' 6\")   Wt 78.5 kg (173 lb)   BMI 27.92 kg/m    General: Healthy, no acute distress  Chest: Respirations even and unlabored  Psych: Coping well, oriented x 3    Assess need for Rhogam:  NO  ABORh: O+      ASSESSMENT/PLAN:     ICD-10-CM    1. SAB (spontaneous ) O03.9 HCG quantitative pregnancy     HCG quant result <1. Confirms early SAB. No repeat quant needed.   Discussed family planning at patient's request. Does plan another pregnancy soon. Encouraged daily prenatal vitamin, avoid alcohol and smoking, and wait until next normal period to begin trying again.     COUNSELING:    Discussed causes of miscarriage including chromosome abnormalities. Understands that nothing can be done to prevent a miscarriage from occuring.       Will call or present to the emergency room if develops heavy bleeding saturating a maxi pad more frequently than every hour or passing large clots.       Can use Ibuprofen if having cramping, up to 800mg every 8 hours.      Encouraged good self care and utilizing support system    Handout provided      Barb Frias CNM, HIGINIO-BC            "

## 2019-07-31 LAB
HBV SURFACE AG SERPL QL IA: NEGATIVE
HIV 1+2 AB+HIV1 P24 AG SERPL QL IA: NON REACTIVE
RUBELLA ABY IGG: 6.95
RUBELLA ABY IGG: NORMAL
RUBELLA ANTIBODY IGG QUANTITATIVE: NORMAL IU/ML

## 2019-10-26 ENCOUNTER — HEALTH MAINTENANCE LETTER (OUTPATIENT)
Age: 34
End: 2019-10-26

## 2020-01-27 ENCOUNTER — HOSPITAL ENCOUNTER (OUTPATIENT)
Facility: CLINIC | Age: 35
Discharge: HOME | End: 2020-01-27
Attending: OBSTETRICS & GYNECOLOGY | Admitting: OBSTETRICS & GYNECOLOGY
Payer: COMMERCIAL

## 2020-01-27 ENCOUNTER — HOSPITAL ENCOUNTER (OUTPATIENT)
Facility: CLINIC | Age: 35
End: 2020-01-27
Admitting: OBSTETRICS & GYNECOLOGY
Payer: COMMERCIAL

## 2020-01-27 VITALS
DIASTOLIC BLOOD PRESSURE: 82 MMHG | RESPIRATION RATE: 18 BRPM | TEMPERATURE: 97.5 F | HEIGHT: 66 IN | WEIGHT: 209 LBS | HEART RATE: 86 BPM | BODY MASS INDEX: 33.59 KG/M2 | SYSTOLIC BLOOD PRESSURE: 114 MMHG

## 2020-01-27 PROBLEM — Z36.89 ENCOUNTER FOR TRIAGE IN PREGNANT PATIENT: Status: ACTIVE | Noted: 2017-09-22

## 2020-01-27 LAB — RUPTURE OF FETAL MEMBRANES BY ROM PLUS: NEGATIVE

## 2020-01-27 PROCEDURE — G0463 HOSPITAL OUTPT CLINIC VISIT: HCPCS

## 2020-01-27 PROCEDURE — 84112 EVAL AMNIOTIC FLUID PROTEIN: CPT | Performed by: OBSTETRICS & GYNECOLOGY

## 2020-01-27 ASSESSMENT — MIFFLIN-ST. JEOR: SCORE: 1674.77

## 2020-01-27 NOTE — PLAN OF CARE
Data: Patient assessed in the [facility] for uterine contractions, leaking vaginal fluid.  Cervical exam very posterior, fingertip dilated, thick and soft.  Membranes intact, ROM plus confirms.  Contractions 2-6 min apart, not painful.  Action:  Presumed adequate fetal oxygenation documented (see flow record). Discharge instructions reviewed.  Patient instructed to report change in fetal movement, vaginal leaking of fluid or bleeding, abdominal pain, or any concerns related to the pregnancy to her nurse/physician.    Response: Orders to discharge home per Deidre Hager.  Patient verbalized understanding of education and verbalized agreement with plan. Discharged to home at 0200.

## 2020-01-27 NOTE — DISCHARGE INSTRUCTIONS
Discharge Instruction for Undelivered Patients      You were seen for: Labor Assessment, Membrane Assessment and Fetal Assessment  We Consulted: Dr. Hager  You had (Test or Medicine):Electronic fetal monitoring, ROM Plus     Diet:   Drink 8 to 12 glasses of liquids (milk, juice, water) every day.  You may eat meals and snacks.   Drink AT LEAST 8 glasses of water in next 24 hours.     Activity:  Call your doctor or nurse midwife if your baby is moving less than usual.   Rest as much as possible the next couple days.    Call your provider if you notice:  Swelling in your face or increased swelling in your hands or legs.  Headaches that are not relieved by Tylenol (acetaminophen).  Changes in your vision (blurring: seeing spots or stars.)  Nausea (sick to your stomach) and vomiting (throwing up).   Weight gain of 5 pounds or more per week.  Heartburn that doesn't go away.  Signs of bladder infection: pain when you urinate (use the toilet), need to go more often and more urgently.  The bag of godinez (rupture of membranes) breaks, or you notice leaking in your underwear.  Bright red blood in your underwear.  Abdominal (lower belly) or stomach pain.  For first baby: Contractions (tightening) less than 5 minutes apart for one hour or more.  Second (plus) baby: Contractions (tightening) less than 10 minutes apart and getting stronger.    Other: Call clinic as needed for cervical check.    Follow-up:  Make an appointment to be seen on 1/28 or 1/29, as needed.

## 2020-01-27 NOTE — PROVIDER NOTIFICATION
01/27/20 0146   Provider Notification   Provider Name/Title Dr. Hager   Method of Notification Phone   Request Evaluate - Remote   Notification Reason Status Update;Membrane Status;SVE;Patient Arrived   MD updated on SVE, UCs, and ROM Plus negative result. MD OK to discharge patient, wants her to drink at least 8 glasses of water in next 24 hrs, rest, and be seen in clinic in next day or two, or as needed for follow up SVE. See orders.

## 2020-01-29 LAB — GROUP B STREP PCR: NEGATIVE

## 2020-02-19 ENCOUNTER — HOSPITAL ENCOUNTER (INPATIENT)
Facility: CLINIC | Age: 35
LOS: 2 days | Discharge: HOME | End: 2020-02-21
Attending: OBSTETRICS & GYNECOLOGY | Admitting: OBSTETRICS & GYNECOLOGY
Payer: COMMERCIAL

## 2020-02-19 ENCOUNTER — ANESTHESIA EVENT (OUTPATIENT)
Dept: OBGYN | Facility: CLINIC | Age: 35
End: 2020-02-19
Payer: COMMERCIAL

## 2020-02-19 ENCOUNTER — ANESTHESIA (OUTPATIENT)
Dept: OBGYN | Facility: CLINIC | Age: 35
End: 2020-02-19
Payer: COMMERCIAL

## 2020-02-19 LAB
ABO + RH BLD: NORMAL
ABO + RH BLD: NORMAL
BLD GP AB SCN SERPL QL: NORMAL
BLOOD BANK CMNT PATIENT-IMP: NORMAL
HGB BLD-MCNC: 13.3 G/DL (ref 11.7–15.7)
SPECIMEN EXP DATE BLD: NORMAL
T PALLIDUM AB SER QL: NONREACTIVE

## 2020-02-19 PROCEDURE — 25000132 ZZH RX MED GY IP 250 OP 250 PS 637: Performed by: OBSTETRICS & GYNECOLOGY

## 2020-02-19 PROCEDURE — 86901 BLOOD TYPING SEROLOGIC RH(D): CPT | Performed by: OBSTETRICS & GYNECOLOGY

## 2020-02-19 PROCEDURE — 12000000 ZZH R&B MED SURG/OB

## 2020-02-19 PROCEDURE — 72200001 ZZH LABOR CARE VAGINAL DELIVERY SINGLE

## 2020-02-19 PROCEDURE — 37000011 ZZH ANESTHESIA WARD SERVICE

## 2020-02-19 PROCEDURE — 00HU33Z INSERTION OF INFUSION DEVICE INTO SPINAL CANAL, PERCUTANEOUS APPROACH: ICD-10-PCS | Performed by: ANESTHESIOLOGY

## 2020-02-19 PROCEDURE — 86850 RBC ANTIBODY SCREEN: CPT | Performed by: OBSTETRICS & GYNECOLOGY

## 2020-02-19 PROCEDURE — 40000671 ZZH STATISTIC ANESTHESIA CASE

## 2020-02-19 PROCEDURE — 25000128 H RX IP 250 OP 636: Performed by: ANESTHESIOLOGY

## 2020-02-19 PROCEDURE — 25000125 ZZHC RX 250: Performed by: ANESTHESIOLOGY

## 2020-02-19 PROCEDURE — 25000125 ZZHC RX 250: Performed by: OBSTETRICS & GYNECOLOGY

## 2020-02-19 PROCEDURE — 3E0R3BZ INTRODUCTION OF ANESTHETIC AGENT INTO SPINAL CANAL, PERCUTANEOUS APPROACH: ICD-10-PCS | Performed by: ANESTHESIOLOGY

## 2020-02-19 PROCEDURE — 86900 BLOOD TYPING SEROLOGIC ABO: CPT | Performed by: OBSTETRICS & GYNECOLOGY

## 2020-02-19 PROCEDURE — 86780 TREPONEMA PALLIDUM: CPT | Performed by: OBSTETRICS & GYNECOLOGY

## 2020-02-19 PROCEDURE — 3E033VJ INTRODUCTION OF OTHER HORMONE INTO PERIPHERAL VEIN, PERCUTANEOUS APPROACH: ICD-10-PCS | Performed by: OBSTETRICS & GYNECOLOGY

## 2020-02-19 PROCEDURE — 25800030 ZZH RX IP 258 OP 636: Performed by: OBSTETRICS & GYNECOLOGY

## 2020-02-19 PROCEDURE — 10907ZC DRAINAGE OF AMNIOTIC FLUID, THERAPEUTIC FROM PRODUCTS OF CONCEPTION, VIA NATURAL OR ARTIFICIAL OPENING: ICD-10-PCS | Performed by: OBSTETRICS & GYNECOLOGY

## 2020-02-19 PROCEDURE — 0KQM0ZZ REPAIR PERINEUM MUSCLE, OPEN APPROACH: ICD-10-PCS | Performed by: OBSTETRICS & GYNECOLOGY

## 2020-02-19 PROCEDURE — 85018 HEMOGLOBIN: CPT | Performed by: OBSTETRICS & GYNECOLOGY

## 2020-02-19 RX ORDER — IBUPROFEN 800 MG/1
800 TABLET, FILM COATED ORAL EVERY 6 HOURS PRN
Status: DISCONTINUED | OUTPATIENT
Start: 2020-02-19 | End: 2020-02-21 | Stop reason: HOSPADM

## 2020-02-19 RX ORDER — NALBUPHINE HYDROCHLORIDE 20 MG/ML
2.5-5 INJECTION, SOLUTION INTRAMUSCULAR; INTRAVENOUS; SUBCUTANEOUS EVERY 6 HOURS PRN
Status: DISCONTINUED | OUTPATIENT
Start: 2020-02-19 | End: 2020-02-19

## 2020-02-19 RX ORDER — BISACODYL 10 MG
10 SUPPOSITORY, RECTAL RECTAL DAILY PRN
Status: DISCONTINUED | OUTPATIENT
Start: 2020-02-21 | End: 2020-02-21 | Stop reason: HOSPADM

## 2020-02-19 RX ORDER — PRENATAL VIT/IRON FUM/FOLIC AC 27MG-0.8MG
1 TABLET ORAL DAILY
Status: DISCONTINUED | OUTPATIENT
Start: 2020-02-20 | End: 2020-02-21 | Stop reason: HOSPADM

## 2020-02-19 RX ORDER — OXYTOCIN/0.9 % SODIUM CHLORIDE 30/500 ML
100 PLASTIC BAG, INJECTION (ML) INTRAVENOUS CONTINUOUS
Status: DISCONTINUED | OUTPATIENT
Start: 2020-02-19 | End: 2020-02-21 | Stop reason: HOSPADM

## 2020-02-19 RX ORDER — LIDOCAINE HYDROCHLORIDE AND EPINEPHRINE 15; 5 MG/ML; UG/ML
INJECTION, SOLUTION EPIDURAL PRN
Status: DISCONTINUED | OUTPATIENT
Start: 2020-02-19 | End: 2020-02-19

## 2020-02-19 RX ORDER — OXYTOCIN 10 [USP'U]/ML
10 INJECTION, SOLUTION INTRAMUSCULAR; INTRAVENOUS
Status: DISCONTINUED | OUTPATIENT
Start: 2020-02-19 | End: 2020-02-21 | Stop reason: HOSPADM

## 2020-02-19 RX ORDER — OXYCODONE AND ACETAMINOPHEN 5; 325 MG/1; MG/1
1 TABLET ORAL
Status: DISCONTINUED | OUTPATIENT
Start: 2020-02-19 | End: 2020-02-19

## 2020-02-19 RX ORDER — METHYLERGONOVINE MALEATE 0.2 MG/ML
200 INJECTION INTRAVENOUS
Status: DISCONTINUED | OUTPATIENT
Start: 2020-02-19 | End: 2020-02-19

## 2020-02-19 RX ORDER — NALOXONE HYDROCHLORIDE 0.4 MG/ML
.1-.4 INJECTION, SOLUTION INTRAMUSCULAR; INTRAVENOUS; SUBCUTANEOUS
Status: DISCONTINUED | OUTPATIENT
Start: 2020-02-19 | End: 2020-02-19

## 2020-02-19 RX ORDER — CARBOPROST TROMETHAMINE 250 UG/ML
250 INJECTION, SOLUTION INTRAMUSCULAR
Status: DISCONTINUED | OUTPATIENT
Start: 2020-02-19 | End: 2020-02-19

## 2020-02-19 RX ORDER — ACETAMINOPHEN 325 MG/1
650 TABLET ORAL EVERY 4 HOURS PRN
Status: DISCONTINUED | OUTPATIENT
Start: 2020-02-19 | End: 2020-02-19

## 2020-02-19 RX ORDER — OXYTOCIN 10 [USP'U]/ML
10 INJECTION, SOLUTION INTRAMUSCULAR; INTRAVENOUS
Status: DISCONTINUED | OUTPATIENT
Start: 2020-02-19 | End: 2020-02-19

## 2020-02-19 RX ORDER — FENTANYL CITRATE 50 UG/ML
50-100 INJECTION, SOLUTION INTRAMUSCULAR; INTRAVENOUS
Status: DISCONTINUED | OUTPATIENT
Start: 2020-02-19 | End: 2020-02-19

## 2020-02-19 RX ORDER — PHENYLEPHRINE HCL IN 0.9% NACL 1 MG/10 ML
100 SYRINGE (ML) INTRAVENOUS EVERY 5 MIN PRN
Status: DISCONTINUED | OUTPATIENT
Start: 2020-02-19 | End: 2020-02-19

## 2020-02-19 RX ORDER — IBUPROFEN 800 MG/1
800 TABLET, FILM COATED ORAL
Status: COMPLETED | OUTPATIENT
Start: 2020-02-19 | End: 2020-02-19

## 2020-02-19 RX ORDER — HYDROCORTISONE 2.5 %
CREAM (GRAM) TOPICAL 3 TIMES DAILY PRN
Status: DISCONTINUED | OUTPATIENT
Start: 2020-02-19 | End: 2020-02-21 | Stop reason: HOSPADM

## 2020-02-19 RX ORDER — OXYTOCIN/0.9 % SODIUM CHLORIDE 30/500 ML
1-24 PLASTIC BAG, INJECTION (ML) INTRAVENOUS CONTINUOUS
Status: DISCONTINUED | OUTPATIENT
Start: 2020-02-19 | End: 2020-02-19

## 2020-02-19 RX ORDER — LIDOCAINE 40 MG/G
CREAM TOPICAL
Status: DISCONTINUED | OUTPATIENT
Start: 2020-02-19 | End: 2020-02-19

## 2020-02-19 RX ORDER — BUPIVACAINE HYDROCHLORIDE 2.5 MG/ML
INJECTION, SOLUTION EPIDURAL; INFILTRATION; INTRACAUDAL PRN
Status: DISCONTINUED | OUTPATIENT
Start: 2020-02-19 | End: 2020-02-19

## 2020-02-19 RX ORDER — SODIUM CHLORIDE, SODIUM LACTATE, POTASSIUM CHLORIDE, CALCIUM CHLORIDE 600; 310; 30; 20 MG/100ML; MG/100ML; MG/100ML; MG/100ML
INJECTION, SOLUTION INTRAVENOUS CONTINUOUS
Status: DISCONTINUED | OUTPATIENT
Start: 2020-02-19 | End: 2020-02-19

## 2020-02-19 RX ORDER — OXYTOCIN/0.9 % SODIUM CHLORIDE 30/500 ML
340 PLASTIC BAG, INJECTION (ML) INTRAVENOUS CONTINUOUS PRN
Status: DISCONTINUED | OUTPATIENT
Start: 2020-02-19 | End: 2020-02-21 | Stop reason: HOSPADM

## 2020-02-19 RX ORDER — ACETAMINOPHEN 325 MG/1
650 TABLET ORAL EVERY 4 HOURS PRN
Status: DISCONTINUED | OUTPATIENT
Start: 2020-02-19 | End: 2020-02-21 | Stop reason: HOSPADM

## 2020-02-19 RX ORDER — AMOXICILLIN 250 MG
2 CAPSULE ORAL 2 TIMES DAILY
Status: DISCONTINUED | OUTPATIENT
Start: 2020-02-19 | End: 2020-02-21 | Stop reason: HOSPADM

## 2020-02-19 RX ORDER — ONDANSETRON 2 MG/ML
4 INJECTION INTRAMUSCULAR; INTRAVENOUS EVERY 6 HOURS PRN
Status: DISCONTINUED | OUTPATIENT
Start: 2020-02-19 | End: 2020-02-19

## 2020-02-19 RX ORDER — MISOPROSTOL 200 UG/1
400 TABLET ORAL
Status: DISCONTINUED | OUTPATIENT
Start: 2020-02-19 | End: 2020-02-21 | Stop reason: HOSPADM

## 2020-02-19 RX ORDER — LANOLIN 100 %
OINTMENT (GRAM) TOPICAL
Status: DISCONTINUED | OUTPATIENT
Start: 2020-02-19 | End: 2020-02-21 | Stop reason: HOSPADM

## 2020-02-19 RX ORDER — OXYTOCIN/0.9 % SODIUM CHLORIDE 30/500 ML
100-340 PLASTIC BAG, INJECTION (ML) INTRAVENOUS CONTINUOUS PRN
Status: COMPLETED | OUTPATIENT
Start: 2020-02-19 | End: 2020-02-19

## 2020-02-19 RX ORDER — AMOXICILLIN 250 MG
1 CAPSULE ORAL 2 TIMES DAILY
Status: DISCONTINUED | OUTPATIENT
Start: 2020-02-19 | End: 2020-02-21 | Stop reason: HOSPADM

## 2020-02-19 RX ORDER — NALOXONE HYDROCHLORIDE 0.4 MG/ML
.1-.4 INJECTION, SOLUTION INTRAMUSCULAR; INTRAVENOUS; SUBCUTANEOUS
Status: DISCONTINUED | OUTPATIENT
Start: 2020-02-19 | End: 2020-02-21 | Stop reason: HOSPADM

## 2020-02-19 RX ADMIN — BUPIVACAINE HYDROCHLORIDE 5 ML: 2.5 INJECTION, SOLUTION EPIDURAL; INFILTRATION; INTRACAUDAL at 11:17

## 2020-02-19 RX ADMIN — SODIUM CHLORIDE, POTASSIUM CHLORIDE, SODIUM LACTATE AND CALCIUM CHLORIDE 1000 ML: 600; 310; 30; 20 INJECTION, SOLUTION INTRAVENOUS at 10:33

## 2020-02-19 RX ADMIN — Medication: at 11:20

## 2020-02-19 RX ADMIN — SENNOSIDES AND DOCUSATE SODIUM 1 TABLET: 8.6; 5 TABLET ORAL at 21:05

## 2020-02-19 RX ADMIN — LIDOCAINE HYDROCHLORIDE 5 ML: 10 INJECTION, SOLUTION EPIDURAL; INFILTRATION; INTRACAUDAL; PERINEURAL at 15:47

## 2020-02-19 RX ADMIN — LIDOCAINE HYDROCHLORIDE,EPINEPHRINE BITARTRATE 3 ML: 15; .005 INJECTION, SOLUTION EPIDURAL; INFILTRATION; INTRACAUDAL; PERINEURAL at 11:10

## 2020-02-19 RX ADMIN — ACETAMINOPHEN 650 MG: 325 TABLET, FILM COATED ORAL at 19:20

## 2020-02-19 RX ADMIN — ACETAMINOPHEN 650 MG: 325 TABLET, FILM COATED ORAL at 23:28

## 2020-02-19 RX ADMIN — SODIUM CHLORIDE, POTASSIUM CHLORIDE, SODIUM LACTATE AND CALCIUM CHLORIDE: 600; 310; 30; 20 INJECTION, SOLUTION INTRAVENOUS at 08:48

## 2020-02-19 RX ADMIN — Medication 340 ML/HR: at 15:41

## 2020-02-19 RX ADMIN — IBUPROFEN 800 MG: 800 TABLET ORAL at 22:01

## 2020-02-19 RX ADMIN — LIDOCAINE HYDROCHLORIDE,EPINEPHRINE BITARTRATE 2 ML: 15; .005 INJECTION, SOLUTION EPIDURAL; INFILTRATION; INTRACAUDAL; PERINEURAL at 11:14

## 2020-02-19 RX ADMIN — Medication 2 MILLI-UNITS/MIN: at 08:47

## 2020-02-19 RX ADMIN — SODIUM CHLORIDE, POTASSIUM CHLORIDE, SODIUM LACTATE AND CALCIUM CHLORIDE: 600; 310; 30; 20 INJECTION, SOLUTION INTRAVENOUS at 11:52

## 2020-02-19 RX ADMIN — BUPIVACAINE HYDROCHLORIDE 5 ML: 2.5 INJECTION, SOLUTION EPIDURAL; INFILTRATION; INTRACAUDAL at 12:19

## 2020-02-19 RX ADMIN — IBUPROFEN 800 MG: 800 TABLET ORAL at 15:54

## 2020-02-19 NOTE — ANESTHESIA PROCEDURE NOTES
Peripheral nerve/Neuraxial procedure note : epidural catheter  Pre-Procedure  Performed by Sergio Samuel MD  Location: OB      Pre-Anesthestic Checklist: patient identified, IV checked, risks and benefits discussed, informed consent, monitors and equipment checked, pre-op evaluation and at physician/surgeon's request    Timeout  Correct Patient: Yes   Correct Procedure: Yes   Correct Site: Yes   Correct Laterality: N/A   Correct Position: Yes   Site Marked: N/A   .   Procedure Documentation    Diagnosis:labor.    Procedure: epidural catheter, .   Patient Position:sitting Insertion Site:L3-4  (midline approach) Injection technique: LORT saline   Local skin infiltrated with 2 mL of 1% lidocaine.  TIFFANIE at 6 cm    Patient Prep/Sterile Barriers; mask, sterile gloves, povidone-iodine 7.5% surgical scrub, patient draped.  .  Needle: Touhy needle   Needle Gauge: 17.    Needle Length (Inches) 3.5   # of attempts: 1 and # of redirects:  .    Catheter: 19 G . .  Catheter threaded easily  .  11 cm at skin.   .    Assessment/Narrative  Paresthesias: Resolved.  .  .  Aspiration negative for heme or CSF  . Test dose of 3 mL lidocaine 1.5% w/ 1:200,000 epinephrine at. Test dose negative for signs of intravascular, subdural or intrathecal injection.

## 2020-02-19 NOTE — PROVIDER NOTIFICATION
02/19/20 1450   Provider Notification   Provider Name/Title Dr. Whyte   Method of Notification Phone   Request Evaluate - Remote   Updated MD on SVE complete and pt urge to push, provider acknowledged indicating she would be right over.

## 2020-02-19 NOTE — ADDENDUM NOTE
Addendum  created 02/19/20 1344 by Sergio Samuel MD    Intraprocedure Event edited, Intraprocedure Meds edited

## 2020-02-19 NOTE — H&P
There has been no significant change in Kendal's general health status based upon review of the prenatal records, nursing database and exam.    Kendal is a 34 year old [id number] IUP 39 weeks who has been admitted for IOL. She has had an uncomplicated pregnancy.     GBS negative  Rh positive  Rubella immune  Received Tdap    Cx= 3cm/80%/-2 AROM= clear fluid    A: 35 yo [id number] IUP at 39 weeks     Admitted for elective induction per patient request  P: Pitocin/epidural       Anticipate

## 2020-02-19 NOTE — PLAN OF CARE
Data: Patient presented to [facility]: 2020  7:32 AM.  Patient admitted for induction for elective induction of labor. Patient is a .  Prenatal record reviewed. Pregnancy has been uncomplicated..  Gestational Age 39w0d. VSS. Fetal movement present. Patient denies uterine contractions, leaking of vaginal fluid/rupture of membranes, vaginal bleeding, abdominal pain, pelvic pressure, nausea, vomiting, headache, visual disturbances, epigastric or URQ pain, significant edema. Support person is present.   Action: Verbal consent for EFM.  Admission assessment completed. Bill of rights reviewed.  Response: Patient verbalized agreement with plan.   Dr Leilani Whyte  @ bedside, reviewed POC with pt and SO, all questions answered.  Pt agreed to proceed with POC as discussed.  SVE per MD, AROM with clear fluid.  Epidural at pt request.

## 2020-02-19 NOTE — PROVIDER NOTIFICATION
02/19/20 1217   Provider Notification   Provider Name/Title Dr. Samuel   MDA @ bedside to address continued discomfort on L side.  Bolus given

## 2020-02-19 NOTE — ANESTHESIA PREPROCEDURE EVALUATION
Anesthesia Pre-Procedure Evaluation    Patient: Kendal Miranda   MRN: [id number] : 1985          Preoperative Diagnosis: * No surgery found *        Past Medical History:   Diagnosis Date     ASCUS on Pap smear 11/10/14    Neg HPV     High grade squamous intraepithelial lesion of cervix      LSIL (low grade squamous intraepithelial lesion) on Pap smear      S/P LEEP of cervix 13    pt lives in [facility]     Past Surgical History:   Procedure Laterality Date     COLP,CX W/UP ADJ VAG,W/BX, CX  2013    MAXIMO 1. 11, 111     HC TOOTH EXTRACTION W/FORCEP       LEEP TX, CERVICAL       Anesthesia Evaluation       history and physical reviewed . Pt has had prior anesthetic. Type: Regional and General    No history of anesthetic complications          ROS/MED HX    ENT/Pulmonary:  - neg pulmonary ROS    (-) asthma   Neurologic:  - neg neurologic ROS    (-) Other neuro hx and Neuropathy   Cardiovascular:  - neg cardiovascular ROS      (-) hypertension, syncope and irregular heartbeat/palpitations   METS/Exercise Tolerance:     Hematologic:  - neg hematologic  ROS       Musculoskeletal:  - neg musculoskeletal ROS       GI/Hepatic:  - neg GI/hepatic ROS      (-) GERD   Renal/Genitourinary:  - ROS Renal section negative       Endo:  - neg endo ROS       Psychiatric:     (+) psychiatric history anxiety      Infectious Disease:  - neg infectious disease ROS       Malignancy:      - no malignancy   Other:                     neg OB ROS            Physical Exam  Normal systems: cardiovascular, pulmonary and dental    Airway   Mallampati: II  TM distance: >3 FB  Neck ROM: full    Dental     Cardiovascular       Pulmonary             Lab Results   Component Value Date    WBC 7.1 2019    HGB 13.3 2020    HCT 41.5 2019     2019    CRP <2.9 2015    SED 6 2015     2016    POTASSIUM 3.8 2016    CHLORIDE 109 2016    CO2 26 2016    BUN 8  "09/23/2016    CR 0.71 09/23/2016    GLC 95 09/23/2016    EDNA 8.1 (L) 09/23/2016    ALBUMIN 3.9 09/23/2016    PROTTOTAL 7.1 09/23/2016    ALT 28 09/23/2016    AST 12 09/23/2016    ALKPHOS 58 09/23/2016    BILITOTAL 0.4 09/23/2016    TSH 1.54 09/23/2016    HCGS Negative 09/23/2016       Preop Vitals  BP Readings from Last 3 Encounters:   02/19/20 123/87   01/27/20 114/82   05/01/19 120/62    Pulse Readings from Last 3 Encounters:   02/19/20 88   01/27/20 86   04/27/19 77      Resp Readings from Last 3 Encounters:   02/19/20 16   01/27/20 18   04/27/19 18    SpO2 Readings from Last 3 Encounters:   04/27/19 100%   12/21/18 98%   10/03/17 98%      Temp Readings from Last 1 Encounters:   02/19/20 98.2  F (36.8  C) (Oral)    Ht Readings from Last 1 Encounters:   01/27/20 1.676 m (5' 6\")      Wt Readings from Last 1 Encounters:   01/27/20 94.8 kg (209 lb)    Estimated body mass index is 33.73 kg/m  as calculated from the following:    Height as of 1/27/20: 1.676 m (5' 6\").    Weight as of 1/27/20: 94.8 kg (209 lb).       Anesthesia Plan  Procedure only, no anesthetic delivered    History & Physical Review  History and physical reviewed and following examination; no interval change.    ASA Status:  2 .  OB Epidural Asa: 2       Plan for Epidural          Postoperative Care      Consents  Anesthetic plan, risks, benefits and alternatives discussed with:  Patient..                 Sergio Samuel MD                    .  "

## 2020-02-19 NOTE — PROVIDER NOTIFICATION
02/19/20 1102   Provider Notification   Provider Name/Title Dr. Samuel   Method of Notification At Bedside   Request Evaluate in Person   Notification Reason Pain   MDA @ bedside for epidural placement, reviewed POC, consent signed.

## 2020-02-20 PROCEDURE — 25000132 ZZH RX MED GY IP 250 OP 250 PS 637: Performed by: OBSTETRICS & GYNECOLOGY

## 2020-02-20 PROCEDURE — 12000000 ZZH R&B MED SURG/OB

## 2020-02-20 RX ORDER — OXYCODONE HYDROCHLORIDE 5 MG/1
5-10 TABLET ORAL
Status: DISCONTINUED | OUTPATIENT
Start: 2020-02-20 | End: 2020-02-21 | Stop reason: HOSPADM

## 2020-02-20 RX ADMIN — OXYCODONE HYDROCHLORIDE 5 MG: 5 TABLET ORAL at 16:39

## 2020-02-20 RX ADMIN — ACETAMINOPHEN 650 MG: 325 TABLET, FILM COATED ORAL at 12:38

## 2020-02-20 RX ADMIN — IBUPROFEN 800 MG: 800 TABLET ORAL at 18:10

## 2020-02-20 RX ADMIN — IBUPROFEN 800 MG: 800 TABLET ORAL at 04:34

## 2020-02-20 RX ADMIN — SENNOSIDES AND DOCUSATE SODIUM 1 TABLET: 8.6; 5 TABLET ORAL at 08:16

## 2020-02-20 RX ADMIN — PRENATAL VITAMINS-IRON FUMARATE 27 MG IRON-FOLIC ACID 0.8 MG TABLET 1 TABLET: at 08:16

## 2020-02-20 RX ADMIN — OXYCODONE HYDROCHLORIDE 5 MG: 5 TABLET ORAL at 20:52

## 2020-02-20 RX ADMIN — IBUPROFEN 800 MG: 800 TABLET ORAL at 11:17

## 2020-02-20 RX ADMIN — OXYCODONE HYDROCHLORIDE 5 MG: 5 TABLET ORAL at 02:24

## 2020-02-20 RX ADMIN — OXYCODONE HYDROCHLORIDE 10 MG: 5 TABLET ORAL at 05:37

## 2020-02-20 RX ADMIN — ACETAMINOPHEN 650 MG: 325 TABLET, FILM COATED ORAL at 04:34

## 2020-02-20 RX ADMIN — ACETAMINOPHEN 650 MG: 325 TABLET, FILM COATED ORAL at 16:39

## 2020-02-20 RX ADMIN — OXYCODONE HYDROCHLORIDE 5 MG: 5 TABLET ORAL at 11:30

## 2020-02-20 RX ADMIN — OXYCODONE HYDROCHLORIDE 5 MG: 5 TABLET ORAL at 08:16

## 2020-02-20 RX ADMIN — SENNOSIDES AND DOCUSATE SODIUM 2 TABLET: 8.6; 5 TABLET ORAL at 20:52

## 2020-02-20 RX ADMIN — ACETAMINOPHEN 650 MG: 325 TABLET, FILM COATED ORAL at 20:52

## 2020-02-20 RX ADMIN — ACETAMINOPHEN 650 MG: 325 TABLET, FILM COATED ORAL at 08:16

## 2020-02-20 RX ADMIN — OXYCODONE HYDROCHLORIDE 5 MG: 5 TABLET ORAL at 14:04

## 2020-02-20 NOTE — PROGRESS NOTES
Public Health Nurse (PHN) did not meet with patient today to discuss resources due to delivery being within 24 hours.  PHN will attempt to meet with patient closer to discharge.

## 2020-02-20 NOTE — L&D DELIVERY NOTE
Delivery Date:  2020      Yosi is a 34-year-old  2, now para 2, with intrauterine pregnancy at 39 weeks who was admitted to Labor and Delivery for elective induction.  She was noted to be 3 cm dilated, 80% effaced, -2 station.  Artificial rupture of membranes was performed.  Clear fluid was noted.  The patient was started on Pitocin.   By 11:42 am the patient was 4 cm dilated.  She received an epidural anesthetic per her request.  Wiithin 3 hours she was completely dilated.      She pushed for approximately 30 minutes, delivering over a midline tear.  The anterior shoulder delivered followed by the rest of body without problems.  The baby was a male infant who was vigorous and crying upon delivery.  He had Apgars of 9 at 1 minute, 9 at 5 minutes.  His weight is pending at this time.  The baby was placed on the patient's abdomen.  Delayed cord clamping was observed for 1 minute.  The cord was doubly clamped and ligated by the father of the baby.  The patient was given IV Pitocin.  Cord bloods were obtained.      The placenta delivered within 7 minutes.  It was noted to be intact with 3 vessels.  Uterus became firm with manual massage.  Inspection revealed that the patient sustained a second-degree midline tear.  The area was infiltrated with 10 mL of 1% lidocaine.  The tear was repaired in the usual manner using 2-0 and 3-0 chromic suture.  The QBL is pending at this time.  Both the patient and the baby were doing well post-repair.         MARTI VICTOR MD             D: 2020   T: 2020   MT: ORALIA      Name:     YOSI KENNEDY   MRN:      [id number]        Account:        [id number]   :      1985        Delivery Date:  2020               Document: [id number]

## 2020-02-20 NOTE — PLAN OF CARE
Data: Kendal Miranda transferred to 422 via wheelchair at 1815. Baby transferred via parent's arms.  Action: Receiving unit notified of transfer: Yes. Patient and family notified of room change. Report to be given to Shelli at 1900. Belongings sent to receiving unit. Accompanied by Registered Nurse. Oriented patient to surroundings. Call light within reach. ID bands double-checked with Yanet KAMINSKI.  Response: Patient tolerated transfer and is stable.

## 2020-02-20 NOTE — ANESTHESIA POSTPROCEDURE EVALUATION
Patient: Kendal Miranda    * No procedures listed *    Diagnosis:* No pre-op diagnosis entered *  Diagnosis Additional Information: labor    Anesthesia Type:  Epidural    Note:  Anesthesia Post Evaluation         Comments:     S/P epidural for labor.   I or my partner was immediately available for management of this patient during epidural analgesia infusion.  VSS.  Doing well. Block resolved.  Neuro at baseline. Denies positional headache. Minimal side effects easily managed w/ PRN meds. No apparent anesthetic complications. No follow-up required.    Sergey Conklin MD          Last vitals:  Vitals:    02/19/20 2149 02/20/20 0225 02/20/20 0805   BP: 123/72 114/57 128/71   Pulse: 79 85    Resp: 16 20 18   Temp: 97.6  F (36.4  C) 98  F (36.7  C) 97.5  F (36.4  C)   SpO2:            Electronically Signed By: Sergey Conklin MD  February 20, 2020  11:01 AM

## 2020-02-20 NOTE — PROVIDER NOTIFICATION
Patient requested for another pain reliever as Tylenol and ibuprofen weren't enough. Currently with 7-8 pain level at the perineal area. Dr. Whyte was informed of this and oxycodone 5-10mg every 3-4 hours was ordered.

## 2020-02-20 NOTE — PLAN OF CARE
Breastfeeding well.  Pain managed with po meds.ice and positioning.  Good I/O  Bonding with baby.

## 2020-02-20 NOTE — LACTATION NOTE
This note was copied from a baby's chart.  LC visit. Her baby has been latching well per her report.  Family was present so no latch observed but she had no questions or concerns.  She used a nipple shield the duration of nursing her first and would like to use it for this baby also. SVETA did discuss weaning from the shield and encouraged her to do so around 2 weeks.  SVETA also suggested lactation support to wean from the shield.  She is aware she may call prn if an OP appt is desired.

## 2020-02-20 NOTE — PROGRESS NOTES
[facility]  Progress Note           Assessment and Plan:   Assessment:   PPD #1 s/p normal vaginal delivery  Second degree midline tear  Afebrile      Plan:   -Pain control: tylenol, ibuprofen, oxycodone PRN  -Activity as tolerated  -Diet as tolerated  -continue cares  -dispo: likely discharge home tomorrow         Interval History:   Comfortable up in room, infant and  at bedside. Reports pain is well controlled with NSAIDs and oxycodone, requiring oxycodone for vaginal pain. Breastfeeding without difficulty, mild abdominal cramping. Voiding independently. Minimal lochia, denies clotting.        Physical Exam:   Blood pressure 128/71, pulse 85, temperature 97.5  F (36.4  C), temperature source Oral, resp. rate 18, last menstrual period 03/02/2019, SpO2 98 %, unknown if currently breastfeeding.    I/O last 3 completed shifts:  In: -   Out: 809 [Urine:500; Blood:309]    Abdomen: soft, firm uterus, non tender to palpation  LE: minimal swelling          Data:     Recent Labs   Lab 02/19/20  0800   HGB 13.3       Gregoria Boucher PA-C

## 2020-02-20 NOTE — PLAN OF CARE
VSS, voiding freely with some burning sensation when urinating. Encouraged to increase fluid intake and to empty bladder regularly. Pain being managed by Tylenol, ibuprofen and oxycodone. Breastfeeding well using a nipple retractor and a nipple shield. Bonding well with baby. No complaints of headache, dizziness, changes in vision, chest pain or difficulty of breathing.

## 2020-02-21 VITALS
OXYGEN SATURATION: 98 % | DIASTOLIC BLOOD PRESSURE: 58 MMHG | TEMPERATURE: 97.8 F | SYSTOLIC BLOOD PRESSURE: 117 MMHG | RESPIRATION RATE: 20 BRPM | HEART RATE: 86 BPM

## 2020-02-21 PROCEDURE — 25000132 ZZH RX MED GY IP 250 OP 250 PS 637: Performed by: OBSTETRICS & GYNECOLOGY

## 2020-02-21 RX ADMIN — ACETAMINOPHEN 650 MG: 325 TABLET, FILM COATED ORAL at 01:08

## 2020-02-21 RX ADMIN — IBUPROFEN 800 MG: 800 TABLET ORAL at 06:27

## 2020-02-21 RX ADMIN — OXYCODONE HYDROCHLORIDE 5 MG: 5 TABLET ORAL at 09:36

## 2020-02-21 RX ADMIN — IBUPROFEN 800 MG: 800 TABLET ORAL at 00:11

## 2020-02-21 RX ADMIN — OXYCODONE HYDROCHLORIDE 5 MG: 5 TABLET ORAL at 03:24

## 2020-02-21 RX ADMIN — OXYCODONE HYDROCHLORIDE 5 MG: 5 TABLET ORAL at 00:11

## 2020-02-21 RX ADMIN — OXYCODONE HYDROCHLORIDE 5 MG: 5 TABLET ORAL at 06:27

## 2020-02-21 RX ADMIN — ACETAMINOPHEN 650 MG: 325 TABLET, FILM COATED ORAL at 09:36

## 2020-02-21 NOTE — PLAN OF CARE
Stable. Motrin, tylenol, and oxy for discomfort. Breastfeeding infant well with shield. Mother love for sore nipples.

## 2020-02-21 NOTE — PLAN OF CARE
Vitals stable throughout the day, fundus firm and midline, bleeding controlled. Pain controlled with Tylenol and Ibuprofen. Breastfeeding every 2-3 hours with an adequate latch score. Bonding well with baby, responding to infant cues. Discharge education completed, reported no further questions.    Left with family at 1120

## 2020-02-21 NOTE — DISCHARGE INSTRUCTIONS
Lactation: [phone]  Please follow up in 6 weeks at your primary office    Postpartum Vaginal Delivery Instructions    Activity       Ask family and friends for help when you need it.    Do not place anything in your vagina for 6 weeks.    You are not restricted on other activities, but take it easy for a few weeks to allow your body to recover from delivery.  You are able to do any activities you feel up to that point.    No driving until you have stopped taking your pain medications (usually two weeks after delivery).     Call your health care provider if you have any of these symptoms:       Increased pain, swelling, redness, or fluid around your stiches from an episiotomy or perineal tear.    A fever above 100.4 F (38 C) with or without chills when placing a thermometer under your tongue.    You soak a sanitary pad with blood within 1 hour, or you see blood clots larger than a golf ball.    Bleeding that lasts more than 6 weeks.    Vaginal discharge that smells bad.    Severe pain, cramping or tenderness in your lower belly area.    A need to urinate more frequently (use the toilet more often), more urgently (use the toilet very quickly), or it burns when you urinate.    Nausea and vomiting.    Redness, swelling or pain around a vein in your leg.    Problems breastfeeding or a red or painful area on your breast.    Chest pain and cough or are gasping for air.    Problems coping with sadness, anxiety, or depression.  If you have any concerns about hurting yourself or the baby, call your provider immediately.     You have questions or concerns after you return home.     Keep your hands clean:  Always wash your hands before touching your perineal area and stitches.  This helps reduce your risk of infection.  If your hands aren't dirty, you may use an alcohol hand-rub to clean your hands. Keep your nails clean and short.

## 2020-02-21 NOTE — PLAN OF CARE
VSS, voiding freely without any problems. Pain being managed by Tylenol, ibuprofen and oxycodone. Independent with self cares and ambulating independently. Breastfeeding and bonding with baby well. No complaints of headache, dizziness or difficulty of breathing.

## 2020-02-21 NOTE — PROGRESS NOTES
[facility]   Obstetrics Progress Note    Subjective: This is the patient's second day since Vaginal delivery. She is doing well.  She is urinating on her own. Pain is controlled with medication.    Objective:   All vitals stable  Temp: 97.8  F (36.6  C) Temp src: Oral BP: 117/58 Pulse: 86 Heart Rate: 96 Resp: 20          EXAM:  Constitutional: healthy, alert, no distress.   Abdomen: Abdomen soft, non-tender. BS normal. No masses, fundus is firm.  JOINT/EXTREMITIES: extremities normal     Last hemoglobin was   Hemoglobin   Date Value Ref Range Status   02/19/2020 13.3 11.7 - 15.7 g/dL Final   ]    Assessment: Stable postpartum course.    Plan: Routine care. Ambulation encouraged  Breast feeding strategies discussed  Pain control measures as needed  Reportable signs and symptoms dicussed with the patient  Discharge later today    Samantha Soriano MD

## 2020-02-21 NOTE — PROGRESS NOTES
Public Health Nurse (PHN) spoke with patient regarding [facility] services and resources. Patient was provided [facility] Public  Health resources handout, home visiting card, follow along card and  car seat installation resources card given and discussed.  Patient accepted referral for home visiting services, Michelle Warning given, referral completed electronically. Patient reports no questions or concerns at this time.

## 2021-01-09 ENCOUNTER — HEALTH MAINTENANCE LETTER (OUTPATIENT)
Age: 36
End: 2021-01-09

## 2021-01-10 NOTE — PROVIDER NOTIFICATION
02/19/20 1238   Provider Notification   Provider Name/Title Dr Whyte   Method of Notification Electronic Page   Request Evaluate - Remote   Notification Reason Uterine Activity;SVE;Status Update      12 hour chart check completed.    No

## 2021-05-03 ENCOUNTER — OFFICE VISIT (OUTPATIENT)
Dept: PEDIATRICS | Facility: CLINIC | Age: 36
End: 2021-05-03
Payer: COMMERCIAL

## 2021-05-03 VITALS
TEMPERATURE: 97.6 F | HEIGHT: 66 IN | OXYGEN SATURATION: 96 % | BODY MASS INDEX: 26.68 KG/M2 | DIASTOLIC BLOOD PRESSURE: 60 MMHG | RESPIRATION RATE: 16 BRPM | SYSTOLIC BLOOD PRESSURE: 102 MMHG | HEART RATE: 72 BPM | WEIGHT: 166 LBS

## 2021-05-03 DIAGNOSIS — L65.9 HAIR LOSS: ICD-10-CM

## 2021-05-03 DIAGNOSIS — R68.89 COLD INTOLERANCE: Primary | ICD-10-CM

## 2021-05-03 PROCEDURE — 36415 COLL VENOUS BLD VENIPUNCTURE: CPT | Performed by: NURSE PRACTITIONER

## 2021-05-03 PROCEDURE — 84443 ASSAY THYROID STIM HORMONE: CPT | Performed by: NURSE PRACTITIONER

## 2021-05-03 PROCEDURE — 99214 OFFICE O/P EST MOD 30 MIN: CPT | Performed by: NURSE PRACTITIONER

## 2021-05-03 PROCEDURE — 82607 VITAMIN B-12: CPT | Performed by: NURSE PRACTITIONER

## 2021-05-03 ASSESSMENT — ENCOUNTER SYMPTOMS
ROS SKIN COMMENTS: HAIR LOSS
FATIGUE: 0
DIAPHORESIS: 1

## 2021-05-03 ASSESSMENT — MIFFLIN-ST. JEOR: SCORE: 1474.72

## 2021-05-03 NOTE — PROGRESS NOTES
Assessment & Plan     Cold intolerance  Hair loss  Pt with 1 year history of cold intolerance, sweating, hair loss, mood swings, and impaired ability to lose weight despite lifestyle changes. Periods are regular. Plan to check TSH, results pending. TSH last checked in 9/23/2016, results normal. Mother had similar symptoms and was found to have B12 deficiency so pt is interested in having B12 level checked.   Plan:   - TSH with free T4 reflex   - Vitamin B12    30 minutes spent on the date of the encounter doing chart review, patient visit and documentation          Follow-up: Lab results pending, will follow-up as indicated after reviewing results.       CONSTANCE Sharma CNP  M [facility] CHRIS Edmonds is a 35 year old who presents for the following health issues     HPI     Concern - hormones  Onset: 1 year  Description: sweating, graham, always cold. Actively working out and doing weight watchers, having a hard time losing weight. Began losing her hair 4 months post partum, began improving but then started worsening again after breastfeeding stopped. Notices that her hair is thinner overall, denies losing clumps of hair.  Intensity: moderate  Progression of Symptoms:  same  Accompanying Signs & Symptoms: fatigue  Previous history of similar problem:   Precipitating factors: None       Worsened by: None  Alleviating factors:        Improved by: None  Therapies tried and outcome:  None     Has a 14 month old son, breast fed until 8-9 months. Periods came back after breastfeeding stopped, regular periods occurring once monthly.     Mother had similar symptoms and was found to have B12 deficiency.     Past Medical History:   Diagnosis Date     ASCUS on Pap smear 11/10/14    Neg HPV     High grade squamous intraepithelial lesion of cervix 1/13     LSIL (low grade squamous intraepithelial lesion) on Pap smear      S/P LEEP of cervix 7/12/13    pt lives in [facility]     Current  "Outpatient Medications   Medication     Misc. Devices (BREAST PUMP) MISC     Prenatal Vit-Fe Fumarate-FA (PRENATAL COMPLETE) 14-0.4 MG TABS     No current facility-administered medications for this visit.         Allergies   Allergen Reactions     Sulfa Drugs Hives         Review of Systems   Constitutional: Positive for diaphoresis. Negative for fatigue.   Endocrine: Positive for cold intolerance.   Skin:        Hair loss   Psychiatric/Behavioral: Positive for mood changes.            Objective    /60 (Cuff Size: Adult Regular)   Pulse 72   Temp 97.6  F (36.4  C) (Tympanic)   Resp 16   Ht 1.676 m (5' 6\")   Wt 75.3 kg (166 lb)   SpO2 96%   BMI 26.79 kg/m    Body mass index is 26.79 kg/m .  Physical Exam  Constitutional:       General: She is not in acute distress.     Appearance: Normal appearance. She is not ill-appearing or toxic-appearing.   Cardiovascular:      Rate and Rhythm: Normal rate and regular rhythm.      Heart sounds: Normal heart sounds. No murmur. No friction rub. No gallop.    Pulmonary:      Effort: Pulmonary effort is normal. No respiratory distress.      Breath sounds: Normal breath sounds. No wheezing, rhonchi or rales.   Skin:     General: Skin is warm and dry.   Neurological:      General: No focal deficit present.      Mental Status: She is alert and oriented to person, place, and time.   Psychiatric:         Mood and Affect: Mood normal.         Behavior: Behavior normal.                        "

## 2021-05-04 LAB
TSH SERPL DL<=0.005 MIU/L-ACNC: 0.84 MU/L (ref 0.4–4)
VIT B12 SERPL-MCNC: 546 PG/ML (ref 193–986)

## 2021-05-05 NOTE — RESULT ENCOUNTER NOTE
Dear Kendal,    I am writing in regards to your recent lab results. Your thyroid function is normal and your vitamin B12 level is within normal range. This is reassuring! Considering your normal lab results, I wouldn't recommend referral to a specialist. I wonder if the hair loss is hormonal related to your most recent pregnancy...it's hard to say for sure. If it's significantly worsening with time, I would recommend referral to dermatology as next step. Continue with efforts towards healthy lifestyle - regular exercise, plenty of sleep, time outside, healthy diet, and self care!    Please don't hesitate to reach out if you have any questions or concerns regarding these results.     Tiffanie Chavis, DNP, APRN, CNP

## 2022-02-12 ENCOUNTER — HEALTH MAINTENANCE LETTER (OUTPATIENT)
Age: 37
End: 2022-02-12

## 2022-10-09 ENCOUNTER — HEALTH MAINTENANCE LETTER (OUTPATIENT)
Age: 37
End: 2022-10-09

## 2022-11-16 ENCOUNTER — E-VISIT (OUTPATIENT)
Dept: URGENT CARE | Facility: CLINIC | Age: 37
End: 2022-11-16
Payer: COMMERCIAL

## 2022-11-16 DIAGNOSIS — J11.1 INFLUENZA WITH RESPIRATORY MANIFESTATION OTHER THAN PNEUMONIA: Primary | ICD-10-CM

## 2022-11-16 PROCEDURE — 99421 OL DIG E/M SVC 5-10 MIN: CPT | Performed by: EMERGENCY MEDICINE

## 2022-11-16 RX ORDER — OSELTAMIVIR PHOSPHATE 75 MG/1
75 CAPSULE ORAL 2 TIMES DAILY
Qty: 10 CAPSULE | Refills: 0 | Status: SHIPPED | OUTPATIENT
Start: 2022-11-16 | End: 2022-11-21

## 2022-11-16 NOTE — PATIENT INSTRUCTIONS
"  Dear Kendal Miranda    After reviewing your responses, I've been able to diagnose you with \"Bronchitis\" which is a common infection of your lungs that is nearly always caused by a virus. The virus causes swelling and irritation of the air passages of your lungs which leads to cough. The illness spreads from your nose and throat to your windpipe and airways. It is often called a \"chest cold\" and can last up to 2 weeks, but is not a serious illness. Exposure to cigarette smoke usually makes this significantly worse.      To treat bronchitis, the main thing to do is drink lots of fluids and rest. Cough medications over-the-counter such as mucinex, robitussin or \"cold and sinus\" medications can be helpful. Ibuprofen and Tylenol also help with fevers or aching feelings that you often have with this kind of illness. Do not take ibuprofen if you have kidney disease, stomach ulcers or allergy to aspirin.     Bronchitis is most often highly contagious as viruses are spread through the air or touch. Avoid contact with others who may become infected, particularly children, the elderly and those whose immune systems might be weak.     If your symptoms worsen, you develop chest pain or shortness of breath, fevers over 101, or are not improving in 5 days, please contact your primary care provider for an appointment or visit any of our convenient Walk-in Care or Urgent Care Centers to be seen which can be found on our website here.    Thanks again for choosing us as your health care partner,    MD Latesha Cardozor Kendal Miranda    Tamiflu ordered for influenza.    Thanks for choosing us as your health care partner,    Go Sam MD  "

## 2023-03-25 ENCOUNTER — HEALTH MAINTENANCE LETTER (OUTPATIENT)
Age: 38
End: 2023-03-25

## 2023-05-15 ENCOUNTER — OFFICE VISIT (OUTPATIENT)
Dept: URGENT CARE | Facility: URGENT CARE | Age: 38
End: 2023-05-15
Payer: COMMERCIAL

## 2023-05-15 VITALS
OXYGEN SATURATION: 99 % | HEART RATE: 97 BPM | BODY MASS INDEX: 29 KG/M2 | DIASTOLIC BLOOD PRESSURE: 69 MMHG | SYSTOLIC BLOOD PRESSURE: 117 MMHG | WEIGHT: 179.7 LBS | TEMPERATURE: 98.7 F | RESPIRATION RATE: 16 BRPM

## 2023-05-15 DIAGNOSIS — J06.9 UPPER RESPIRATORY TRACT INFECTION, UNSPECIFIED TYPE: ICD-10-CM

## 2023-05-15 DIAGNOSIS — B00.1 COLD SORE: Primary | ICD-10-CM

## 2023-05-15 PROCEDURE — 99214 OFFICE O/P EST MOD 30 MIN: CPT | Performed by: PHYSICIAN ASSISTANT

## 2023-05-15 RX ORDER — IBUPROFEN 200 MG
200 TABLET ORAL EVERY 4 HOURS PRN
COMMUNITY

## 2023-05-15 RX ORDER — VALACYCLOVIR HYDROCHLORIDE 1 G/1
2000 TABLET, FILM COATED ORAL 2 TIMES DAILY
Qty: 4 TABLET | Refills: 1 | Status: SHIPPED | OUTPATIENT
Start: 2023-05-15 | End: 2023-05-16

## 2023-05-15 NOTE — PROGRESS NOTES
SUBJECTIVE:  Kendal Miranda is a 37 year old female who presents to the clinic today with a chief complaint of cough  for 3 day(s).  Her cough is described as productive of yellow sputum.    The patient's symptoms are moderate and stable.  Associated symptoms include fever. The patient's symptoms are exacerbated by no particular triggers  Patient has been using nothing  to improve symptoms.    Past Medical History:   Diagnosis Date     ASCUS on Pap smear 11/10/14    Neg HPV     High grade squamous intraepithelial lesion of cervix 1/13     LSIL (low grade squamous intraepithelial lesion) on Pap smear      S/P LEEP of cervix 7/12/13    [place] [facility]       Current Outpatient Medications   Medication Sig Dispense Refill     Acetaminophen 325 MG CAPS Take 325-650 mg by mouth every 4 hours as needed       Docosanol (ABREVA EX)        ibuprofen (ADVIL/MOTRIN) 200 MG tablet Take 200 mg by mouth every 4 hours as needed for pain       Misc. Devices (BREAST PUMP) MISC 1 each as needed (Patient not taking: Reported on 5/15/2023) 1 each 0     Prenatal Vit-Fe Fumarate-FA (PRENATAL COMPLETE) 14-0.4 MG TABS  (Patient not taking: Reported on 5/15/2023)         Social History     Tobacco Use     Smoking status: Never     Smokeless tobacco: Never     Tobacco comments:     non smoking home   Vaping Use     Vaping status: Never Used   Substance Use Topics     Alcohol use: No     Alcohol/week: 0.0 standard drinks of alcohol       ROS  Review of systems negative except as stated above.    OBJECTIVE:  /69   Pulse 97   Temp 98.7  F (37.1  C)   Resp 16   Wt 81.5 kg (179 lb 11.2 oz)   LMP 05/06/2023 (Approximate)   SpO2 99%   BMI 29.00 kg/m    GENERAL APPEARANCE: healthy, alert and no distress  EYES: EOMI,  PERRL, conjunctiva clear  HENT: ear canals and TM's normal.  Nose and mouth without ulcers, erythema or lesions  NECK: supple, nontender, no lymphadenopathy  RESP: lungs clear to auscultation - no rales, rhonchi or  wheezes  CV: regular rates and rhythm, normal S1 S2, no murmur noted  NEURO: Normal strength and tone, sensory exam grossly normal,  normal speech and mentation  SKIN: no suspicious lesions or rashes    ASSESSMENT:    (B00.1) Cold sore  (primary encounter diagnosis)  Plan: valACYclovir (VALTREX) 1000 mg tablet          (J06.9) Upper respiratory tract infection, unspecified type  Comment: no evidence of bacterial complications  Plan: as below    Patient Instructions   1.  Plenty of fluids, rest, warm compresses on face  2.  Mucinex twice daily for at least 4 days  3.  Shyla Pot 1x in the morning 1x at night (SALINE MIST SPRAY IS AN ACCEPTABLE, THOUGH NOT AS EFFECTIVE REPLACEMENT)  4.  Benadryl (diphenhydramine) at bedtime   5.  Either Claritin (Loratadine), Allegra (Fexofenadine), or Zyrtec (Cetirizine) in the day  6.  Flonase (Fluticasone) 2x each nostril twice a day for two weeks, then once each nostril once a day       Please let us know if symptoms persist, or worsen.  Fever and focal pain may be a sign of a bacterial infection which may need antibiotic treatment

## 2023-05-15 NOTE — PATIENT INSTRUCTIONS
1.  Plenty of fluids, rest, warm compresses on face  2.  Mucinex twice daily for at least 4 days  3.  Shyla Pot 1x in the morning 1x at night (SALINE MIST SPRAY IS AN ACCEPTABLE, THOUGH NOT AS EFFECTIVE REPLACEMENT)  4.  Benadryl (diphenhydramine) at bedtime   5.  Either Claritin (Loratadine), Allegra (Fexofenadine), or Zyrtec (Cetirizine) in the day  6.  Flonase (Fluticasone) 2x each nostril twice a day for two weeks, then once each nostril once a day       Please let us know if symptoms persist, or worsen.  Fever and focal pain may be a sign of a bacterial infection which may need antibiotic treatment

## 2023-07-31 ENCOUNTER — APPOINTMENT (OUTPATIENT)
Dept: LAB | Facility: CLINIC | Age: 38
End: 2023-07-31
Payer: COMMERCIAL

## 2023-12-14 ENCOUNTER — E-VISIT (OUTPATIENT)
Dept: URGENT CARE | Facility: CLINIC | Age: 38
End: 2023-12-14
Payer: COMMERCIAL

## 2023-12-14 ENCOUNTER — TELEPHONE (OUTPATIENT)
Dept: PEDIATRICS | Facility: CLINIC | Age: 38
End: 2023-12-14
Payer: COMMERCIAL

## 2023-12-14 DIAGNOSIS — B30.9 VIRAL CONJUNCTIVITIS: Primary | ICD-10-CM

## 2023-12-14 PROCEDURE — 99421 OL DIG E/M SVC 5-10 MIN: CPT | Performed by: PHYSICIAN ASSISTANT

## 2023-12-14 NOTE — TELEPHONE ENCOUNTER
Patient called and was requested medication for pink eye. No vision changes, dizziness. Her children have been having pink eye. Recommended e-visit. Patient was given an opportunity to ask questions, verbalized understanding of plan, and is agreeable.  Instructed pt to call back if new or worsening symptoms.        Grisel ESCALERA RN on 12/14/2023 at 3:33 PM

## 2023-12-15 RX ORDER — CIPROFLOXACIN HYDROCHLORIDE 3.5 MG/ML
SOLUTION/ DROPS TOPICAL
Qty: 5 ML | Refills: 0 | Status: SHIPPED | OUTPATIENT
Start: 2023-12-15 | End: 2023-12-22

## 2023-12-15 NOTE — PATIENT INSTRUCTIONS
Kendal Miranda,    Your photo and description of symptoms are consistent with pink eye caused by a virus. This can cause redness, irritation and itching in your eye as well as tearing and intermediate thickened discharge. Your eyes may even be stuck shut when when you wake up in the morning. Your eyelids may also become swollen. You'll be contagious while you have tearing and crusting in your eyes, generally 7-10 days. Wash your hands frequently and avoid touching your eyes to prevent spreading to others. You may use over the counter lubricating eye drops to help ease your symptoms. Allergy eye drops such as Patanol (olopatadine) or Zaditor (ketotifen) will help to control the itching. Avoid redness reducing eye drops for an extended period of time as these can cause a rebound and make the redness and irritation return when you stop using the drops. Cool packs on your eyes can help with irritation and swelling.     Antibiotic drops will not make a virus go away any faster and will not make you less contagious. However, if you notice that there is thick yellow or green discharge that is consistently draining from your eye (you're wiping it away every few minutes) then an antibiotic drop will help. I have sent a prescription to the pharmacy for you. Follow the instructions on the medication.    If your symptoms are getting worse or not improving by the 10th day of symptoms, be seen in person for further evaluation.    You'll be feeling better soon!    Vania Shipman PA-C  [facility]  Pinkeye: Care Instructions  Overview     Pinkeye is redness and swelling of the eye surface and the conjunctiva (the lining of the eyelid and the covering of the white part of the eye). Pinkeye is also called conjunctivitis. Pinkeye is often caused by infection with bacteria or a virus. Dry air, allergies, smoke, and chemicals are other common causes.  Pinkeye often gets better on its own in 7 to 10 days. Antibiotics  only help if the pinkeye is caused by bacteria. Pinkeye caused by infection spreads easily. If an allergy or chemical is causing pinkeye, it will not go away unless you can avoid whatever is causing it.  Follow-up care is a key part of your treatment and safety. Be sure to make and go to all appointments, and call your doctor if you are having problems. It's also a good idea to know your test results and keep a list of the medicines you take.  How can you care for yourself at home?  Wash your hands often. Always wash them before and after you treat pinkeye or touch your eyes or face.  Use moist cotton or a clean, wet cloth to remove crust. Wipe from the inside corner of the eye to the outside. Use a clean part of the cloth for each wipe.  Put cold or warm wet cloths on your eye a few times a day if the eye hurts.  Do not wear contact lenses or eye makeup until the pinkeye is gone. Throw away any eye makeup you were using when you got pinkeye. Clean your contacts and storage case. If you wear disposable contacts, use a new pair when your eye has cleared and it is safe to wear contacts again.  If the doctor gave you antibiotic ointment or eyedrops, use them as directed. Use the medicine for as long as instructed, even if your eye starts looking better soon. Keep the bottle tip clean, and do not let it touch the eye area.  To put in eyedrops or ointment:  Tilt your head back, and pull your lower eyelid down with one finger.  Drop or squirt the medicine inside the lower lid.  Close your eye for 30 to 60 seconds to let the drops or ointment move around.  Do not touch the ointment or dropper tip to your eyelashes or any other surface.  Do not share towels, pillows, or washcloths while you have pinkeye.  When should you call for help?   Call your doctor now or seek immediate medical care if:    You have pain in your eye, not just irritation on the surface.     You have a change in vision or loss of vision.     You have an  "increase in discharge from the eye.     Your eye has not started to improve or begins to get worse within 48 hours after you start using antibiotics.     Pinkeye lasts longer than 7 days.   Watch closely for changes in your health, and be sure to contact your doctor if you have any problems.  Where can you learn more?  Go to https://www.[employer].net/patiented  Enter Y392 in the search box to learn more about \"Pinkeye: Care Instructions.\"  Current as of: June 6, 2023               Content Version: 13.8    2208-8104 [employer].   Care instructions adapted under license by your healthcare professional. If you have questions about a medical condition or this instruction, always ask your healthcare professional. [employer] disclaims any warranty or liability for your use of this information.      "

## 2024-01-29 NOTE — TELEPHONE ENCOUNTER
Call Type: Triage Call    Presenting Problem: Kendal states that she is 20 weeks pregnant. She  is having left sided abd pain. Pain comes in waves. Started about 12  hours ago. No vomiting, diarrhea, or fever. Rates pain 6/10. On call  OB paged at 0215 to call patient at [phone], per triage  guidelines.  Triage Note:  Guideline Title: Pregnancy: Abdominal Pain  Recommended Disposition: Call Provider Immediately  Original Inclination: Wanted to speak with a nurse  Override Disposition:  Intended Action: Call PCP/HCP  Physician Contacted: No  Abdominal or pelvic pain that has steadily worsened; has been continuous for 3  hours or more; OR that had stopped and has now returned ?  YES  Signs of dehydration ? NO  IUD or cerclage in place ? NO  Fluid leaking or rupture of membranes ? NO  Severe breathing problems ? NO  Recent version procedure ? NO  New or worsening signs and symptoms that may indicate shock ? NO  Less than 20 weeks gestation AND vaginal bleeding ? NO  More than 20 weeks gestation AND vaginal bleeding ? NO  Gestation more than 37 weeks AND signs of labor ? NO  Gestation less than 20 weeks AND signs of labor ? NO  20-37 weeks gestation AND signs of labor ? NO  Sudden onset or recurring abdominal pain that radiates to upper back or shoulder ?  NO  Passing red, black or tarry material from rectum AND onset of new signs and  symptoms of hypovolemia ? NO  Unbearable abdominal/pelvic pain or cramping ? NO  Any cardiac signs/symptoms for more than 5 minutes, now or within last hour ? NO  Vomiting red, bloody or coffee-ground material, more than streaks of blood or  scant amount (not following nosebleed within past day) ? NO  Temperature 100 F (37.7 C) or greater ? NO  Burning sensation in the epigastic area AND no other abdominal discomfort or other  related symptoms ? NO  Generalized abdominal pain that progresses to localized pain AND any of the  following: loss of appetite, vomiting starting after pain,  Pt last office visit was 2/25/22 with [facility], appt scheduled for 2/5/24   "any fever, OR unable  to carry out normal activities ? NO  Abdominal pain that has steadily worsened over hours OR has been continuous for 3  hours or more AND any of the following: loss of appetite, vomiting starting after  pain, any fever, OR unable to carry out normal activities ? NO  Sudden onset or recurring abdominal pain that radiates to upper back or shoulder  AND any of the following: loss of appetite, vomiting starting after pain, any  fever, OR unable to carry out normal activities ? NO  Pain in flank, low back, over bladder, groin or perineum AND sharp pain with  urination, or felt something \"pass\" with urination, or blood in urine ? NO  Recent amniocentesis/chorionic villus sampling ([facility]) ? NO  New onset of epigastric or right upper quadrant pain with known pregnancy induced  hypertension ? NO  Continuous bright red vaginal bleeding for more than 15 minutes (more than  spotting) ? NO  Gestation 20 weeks or more AND decreased fetal movement compared to previous  activity ? NO  Unbearable headache ? NO  Injury to abdomen or pelvis ? NO  Physician Instructions:  Care Advice: Do not eat or drink anything until evaluated by provider.  "

## 2024-05-25 ENCOUNTER — HEALTH MAINTENANCE LETTER (OUTPATIENT)
Age: 39
End: 2024-05-25

## 2025-06-14 ENCOUNTER — HEALTH MAINTENANCE LETTER (OUTPATIENT)
Age: 40
End: 2025-06-14